# Patient Record
Sex: FEMALE | Race: WHITE | Employment: OTHER | URBAN - METROPOLITAN AREA
[De-identification: names, ages, dates, MRNs, and addresses within clinical notes are randomized per-mention and may not be internally consistent; named-entity substitution may affect disease eponyms.]

---

## 2017-07-20 ENCOUNTER — HOSPITAL ENCOUNTER (EMERGENCY)
Age: 62
Discharge: HOME | End: 2017-07-20
Attending: EMERGENCY MEDICINE | Admitting: EMERGENCY MEDICINE
Payer: MEDICARE

## 2017-07-20 ENCOUNTER — APPOINTMENT (OUTPATIENT)
Dept: GENERAL RADIOLOGY | Age: 62
End: 2017-07-20
Attending: EMERGENCY MEDICINE
Payer: MEDICARE

## 2017-07-20 VITALS
HEIGHT: 67 IN | BODY MASS INDEX: 25.58 KG/M2 | HEART RATE: 105 BPM | DIASTOLIC BLOOD PRESSURE: 70 MMHG | RESPIRATION RATE: 13 BRPM | TEMPERATURE: 98.7 F | SYSTOLIC BLOOD PRESSURE: 115 MMHG | WEIGHT: 163 LBS | OXYGEN SATURATION: 95 %

## 2017-07-20 DIAGNOSIS — N20.0 KIDNEY STONE: ICD-10-CM

## 2017-07-20 DIAGNOSIS — R55 VASOVAGAL NEAR SYNCOPE: Primary | ICD-10-CM

## 2017-07-20 DIAGNOSIS — E86.0 DEHYDRATION: ICD-10-CM

## 2017-07-20 DIAGNOSIS — K59.00 CONSTIPATION, UNSPECIFIED CONSTIPATION TYPE: ICD-10-CM

## 2017-07-20 LAB
ALBUMIN SERPL BCP-MCNC: 3.3 G/DL (ref 3.4–5)
ALBUMIN/GLOB SERPL: 1 {RATIO} (ref 0.8–1.7)
ALP SERPL-CCNC: 77 U/L (ref 45–117)
ALT SERPL-CCNC: 25 U/L (ref 13–56)
ANION GAP BLD CALC-SCNC: 10 MMOL/L (ref 3–18)
AST SERPL W P-5'-P-CCNC: 26 U/L (ref 15–37)
ATRIAL RATE: 91 BPM
BASOPHILS # BLD AUTO: 0 K/UL (ref 0–0.1)
BASOPHILS # BLD: 0 % (ref 0–2)
BILIRUB SERPL-MCNC: 0.5 MG/DL (ref 0.2–1)
BUN SERPL-MCNC: 11 MG/DL (ref 7–18)
BUN/CREAT SERPL: 34 (ref 12–20)
CALCIUM SERPL-MCNC: 9.2 MG/DL (ref 8.5–10.1)
CALCULATED P AXIS, ECG09: -18 DEGREES
CALCULATED R AXIS, ECG10: -29 DEGREES
CALCULATED T AXIS, ECG11: -77 DEGREES
CHLORIDE SERPL-SCNC: 106 MMOL/L (ref 100–108)
CO2 SERPL-SCNC: 25 MMOL/L (ref 21–32)
CREAT SERPL-MCNC: 0.32 MG/DL (ref 0.6–1.3)
DIAGNOSIS, 93000: NORMAL
DIFFERENTIAL METHOD BLD: ABNORMAL
EOSINOPHIL # BLD: 0.1 K/UL (ref 0–0.4)
EOSINOPHIL NFR BLD: 1 % (ref 0–5)
ERYTHROCYTE [DISTWIDTH] IN BLOOD BY AUTOMATED COUNT: 13.7 % (ref 11.6–14.5)
GLOBULIN SER CALC-MCNC: 3.3 G/DL (ref 2–4)
GLUCOSE SERPL-MCNC: 130 MG/DL (ref 74–99)
HCT VFR BLD AUTO: 39.8 % (ref 35–45)
HGB BLD-MCNC: 13 G/DL (ref 12–16)
LYMPHOCYTES # BLD AUTO: 37 % (ref 21–52)
LYMPHOCYTES # BLD: 3 K/UL (ref 0.9–3.6)
MCH RBC QN AUTO: 31.8 PG (ref 24–34)
MCHC RBC AUTO-ENTMCNC: 32.7 G/DL (ref 31–37)
MCV RBC AUTO: 97.3 FL (ref 74–97)
MONOCYTES # BLD: 0.3 K/UL (ref 0.05–1.2)
MONOCYTES NFR BLD AUTO: 4 % (ref 3–10)
NEUTS SEG # BLD: 4.7 K/UL (ref 1.8–8)
NEUTS SEG NFR BLD AUTO: 58 % (ref 40–73)
P-R INTERVAL, ECG05: 134 MS
PLATELET # BLD AUTO: 292 K/UL (ref 135–420)
PMV BLD AUTO: 9.2 FL (ref 9.2–11.8)
POTASSIUM SERPL-SCNC: 3.9 MMOL/L (ref 3.5–5.5)
PROT SERPL-MCNC: 6.6 G/DL (ref 6.4–8.2)
Q-T INTERVAL, ECG07: 372 MS
QRS DURATION, ECG06: 98 MS
QTC CALCULATION (BEZET), ECG08: 457 MS
RBC # BLD AUTO: 4.09 M/UL (ref 4.2–5.3)
SODIUM SERPL-SCNC: 141 MMOL/L (ref 136–145)
VENTRICULAR RATE, ECG03: 91 BPM
WBC # BLD AUTO: 8.1 K/UL (ref 4.6–13.2)

## 2017-07-20 PROCEDURE — 93005 ELECTROCARDIOGRAM TRACING: CPT

## 2017-07-20 PROCEDURE — 74011250636 HC RX REV CODE- 250/636: Performed by: EMERGENCY MEDICINE

## 2017-07-20 PROCEDURE — 96361 HYDRATE IV INFUSION ADD-ON: CPT

## 2017-07-20 PROCEDURE — 96360 HYDRATION IV INFUSION INIT: CPT

## 2017-07-20 PROCEDURE — 74022 RADEX COMPL AQT ABD SERIES: CPT

## 2017-07-20 PROCEDURE — 85025 COMPLETE CBC W/AUTO DIFF WBC: CPT | Performed by: EMERGENCY MEDICINE

## 2017-07-20 PROCEDURE — 99285 EMERGENCY DEPT VISIT HI MDM: CPT

## 2017-07-20 PROCEDURE — 80053 COMPREHEN METABOLIC PANEL: CPT | Performed by: EMERGENCY MEDICINE

## 2017-07-20 RX ADMIN — SODIUM CHLORIDE 1000 ML: 900 INJECTION, SOLUTION INTRAVENOUS at 12:09

## 2017-07-20 NOTE — ED TRIAGE NOTES
Pt arrived via EMS alert & oriented times 4. Pt c/o passing out on commode. Pt has a history of MS. Pt c/o neck pain.

## 2017-07-20 NOTE — DISCHARGE INSTRUCTIONS
Constipation: Care Instructions  Your Care Instructions  Constipation means that you have a hard time passing stools (bowel movements). People pass stools from 3 times a day to once every 3 days. What is normal for you may be different. Constipation may occur with pain in the rectum and cramping. The pain may get worse when you try to pass stools. Sometimes there are small amounts of bright red blood on toilet paper or the surface of stools. This is because of enlarged veins near the rectum (hemorrhoids). A few changes in your diet and lifestyle may help you avoid ongoing constipation. Your doctor may also prescribe medicine to help loosen your stool. Some medicines can cause constipation. These include pain medicines and antidepressants. Tell your doctor about all the medicines you take. Your doctor may want to make a medicine change to ease your symptoms. Follow-up care is a key part of your treatment and safety. Be sure to make and go to all appointments, and call your doctor if you are having problems. It's also a good idea to know your test results and keep a list of the medicines you take. How can you care for yourself at home? · Drink plenty of fluids, enough so that your urine is light yellow or clear like water. If you have kidney, heart, or liver disease and have to limit fluids, talk with your doctor before you increase the amount of fluids you drink. · Include high-fiber foods in your diet each day. These include fruits, vegetables, beans, and whole grains. · Get at least 30 minutes of exercise on most days of the week. Walking is a good choice. You also may want to do other activities, such as running, swimming, cycling, or playing tennis or team sports. · Take a fiber supplement, such as Citrucel or Metamucil, every day. Read and follow all instructions on the label. · Schedule time each day for a bowel movement. A daily routine may help.  Take your time having your bowel movement. · Support your feet with a small step stool when you sit on the toilet. This helps flex your hips and places your pelvis in a squatting position. · Your doctor may recommend an over-the-counter laxative to relieve your constipation. Examples are Milk of Magnesia and MiraLax. Read and follow all instructions on the label. Do not use laxatives on a long-term basis. When should you call for help? Call your doctor now or seek immediate medical care if:  · You have new or worse belly pain. · You have new or worse nausea or vomiting. · You have blood in your stools. Watch closely for changes in your health, and be sure to contact your doctor if:  · Your constipation is getting worse. · You do not get better as expected. Where can you learn more? Go to http://pedro luis-derrell.info/. Enter [id number] in the search box to learn more about \"Constipation: Care Instructions. \"  Current as of: March 20, 2017  Content Version: 11.3  © 6709-0950 [employer]. Care instructions adapted under license by [employer] (which disclaims liability or warranty for this information). If you have questions about a medical condition or this instruction, always ask your healthcare professional. [place] [address] any warranty or liability for your use of this information. Dehydration: Care Instructions  Your Care Instructions  Dehydration happens when your body loses too much fluid. This might happen when you do not drink enough water or you lose large amounts of fluids from your body because of diarrhea, vomiting, or sweating. Severe dehydration can be life-threatening. Water and minerals called electrolytes help put your body fluids back in balance. Learn the early signs of fluid loss, and drink more fluids to prevent dehydration. Follow-up care is a key part of your treatment and safety.  Be sure to make and go to all appointments, and call your doctor if you are having problems. It's also a good idea to know your test results and keep a list of the medicines you take. How can you care for yourself at home? · To prevent dehydration, drink plenty of fluids, enough so that your urine is light yellow or clear like water. Choose water and other caffeine-free clear liquids until you feel better. If you have kidney, heart, or liver disease and have to limit fluids, talk with your doctor before you increase the amount of fluids you drink. · If you do not feel like eating or drinking, try taking small sips of water, sports drinks, or other rehydration drinks. · Get plenty of rest.  To prevent dehydration  · Add more fluids to your diet and daily routine, unless your doctor has told you not to. · During hot weather, drink more fluids. Drink even more fluids if you exercise a lot. Stay away from drinks with alcohol or caffeine. · Watch for the symptoms of dehydration. These include:  ¨ A dry, sticky mouth. ¨ Dark yellow urine, and not much of it. ¨ Dry and sunken eyes. ¨ Feeling very tired. · Learn what problems can lead to dehydration. These include:  ¨ Diarrhea, fever, and vomiting. ¨ Any illness with a fever, such as pneumonia or the flu. ¨ Activities that cause heavy sweating, such as endurance races and heavy outdoor work in hot or humid weather. ¨ Alcohol or drug abuse or withdrawal.  ¨ Certain medicines, such as cold and allergy pills (antihistamines), diet pills (diuretics), and laxatives. ¨ Certain diseases, such as diabetes, cancer, and heart or kidney disease. When should you call for help? Call 911 anytime you think you may need emergency care. For example, call if:  · You passed out (lost consciousness). Call your doctor now or seek immediate medical care if:  · You are confused and cannot think clearly. · You are dizzy or lightheaded, or you feel like you may faint. · You have signs of needing more fluids.  You have sunken eyes and a dry mouth, and you pass only a little dark urine. · You cannot keep fluids down. Watch closely for changes in your health, and be sure to contact your doctor if:  · You are not making tears. · Your skin is very dry and sags slowly back into place after you pinch it. · Your mouth and eyes are very dry. Where can you learn more? Go to http://pedro luis-derrell.info/. Enter E613 in the search box to learn more about \"Dehydration: Care Instructions. \"  Current as of: March 20, 2017  Content Version: 11.3  © 5174-9347 [employer]. Care instructions adapted under license by [employer] (which disclaims liability or warranty for this information). If you have questions about a medical condition or this instruction, always ask your healthcare professional. [address] any warranty or liability for your use of this information. Vasovagal Syncope: Care Instructions  Your Care Instructions    Vasovagal syncope (say \"nlj-rce-PLZ-gul PQRH-nra-rqy\") is sudden dizziness or fainting that can be set off by things such as pain, stress, fear, or trauma. You may sweat or feel lightheaded, sick to your stomach, or tingly. The problem causes the heart rate to slow and the blood vessels to widen, or dilate, for a short time. When this happens, blood pools in the lower body, and less blood goes to the brain. You can usually get relief by lying down with your legs raised (elevated). This helps more blood to flow to your brain and may help relieve symptoms like feeling dizzy. Some doctors may recommend a technique that involves tensing your fists and arms. This type of fainting is often easy to predict. For example, it happens to some people when they see blood or have to get a shot. They may feel symptoms before they faint. An episode of vasovagal syncope usually responds well to self-care. Other treatment often isn't needed.  But if the fainting keeps happening, your doctor may suggest further treatments. Follow-up care is a key part of your treatment and safety. Be sure to make and go to all appointments, and call your doctor if you are having problems. It's also a good idea to know your test results and keep a list of the medicines you take. How can you care for yourself at home? · Drink plenty of fluids to prevent dehydration. If you have kidney, heart, or liver disease and have to limit fluids, talk with your doctor before you increase your fluid intake. · Try to avoid things that you think may set off vasovagal syncope. · Talk to your doctor about any medicines you take. Some medicines may increase the chance of this condition occurring. · If you feel symptoms, lie down with your legs raised. Talk to your doctor about what to do if your symptoms come back. When should you call for help? Call 911 anytime you think you may need emergency care. For example, call if:  · You have symptoms of a heart problem. These may include:  ¨ Chest pain or pressure. ¨ Severe trouble breathing. ¨ A fast or irregular heartbeat. Watch closely for changes in your health, and be sure to contact your doctor if:  · You have more episodes of fainting at home. · You do not get better as expected. Where can you learn more? Go to http://pedro luis-derrell.info/. Enter L754 in the search box to learn more about \"Vasovagal Syncope: Care Instructions. \"  Current as of: March 20, 2017  Content Version: 11.3  © 8330-4444 [employer]. Care instructions adapted under license by [employer] (which disclaims liability or warranty for this information). If you have questions about a medical condition or this instruction, always ask your healthcare professional. [place] [address] any warranty or liability for your use of this information.

## 2017-07-20 NOTE — ED PROVIDER NOTES
HPI Comments: Pt with history of spastic paraparesis/ALS, paroxysmal V tach with AICD in place, muscle atrophy, fibromyalgia and anxiety, presents to ED with complaint of lightheadedness while on the toilet this morning. She notes that she is chronically constipated and \"I've been trying to pass a kidney stone and had to have a bowel movement this morning\" when she became lightheaded. She denies any LOC. She notes L flank pain which she attributes to her kidney stone. She is followed by urology, Dr. Ny Villalpando. She denies any firing or abnormality of her AICD this morning. Pt denies any recent changes to her medications. No recent vomiting or diarrhea and pt states she has been eating and drinking normally. No other acute symptoms or complaints were noted.        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)     Hx: UTI (urinary tract infection)     ICD (implantable cardiac defibrillator) in place     Leg edema     Leg pain     Macrocytosis     Memory loss     Migraine     Muscle atrophy     Myalgia and myositis, unspecified     Osteoarthritis     Osteoarthrosis, unspecified whether generalized or localized, unspecified site     Paroxysmal ventricular tachycardia (HCC)     Piercing     Rosacea     Spastic paraparesis (HCC)     Ventricular tachycardia (paroxysmal) (HCC)     Vitamin D deficiency        Past Surgical History:   Procedure Laterality Date    HX HYSTERECTOMY      HX ORTHOPAEDIC  5/10    HX OTHER SURGICAL CHG INTERNAL DEFIBRILLATOR    HX OTHER SURGICAL  10/26/11    LAP CHOLECYSTECTOMY/GRAPH         History reviewed. No pertinent family history. Social History     Social History    Marital status: LEGALLY      Spouse name: N/A    Number of children: N/A    Years of education: N/A     Occupational History    Not on file. Social History Main Topics    Smoking status: Never Smoker    Smokeless tobacco: Never Used    Alcohol use Yes      Comment: occ    Drug use: Not on file    Sexual activity: Not on file     Other Topics Concern    Not on file     Social History Narrative         ALLERGIES: Amoxicillin; Ancef [cefazolin]; Atenolol; Avelox [moxifloxacin]; and Hydrochlorothiazide    Review of Systems   Constitutional: Negative for chills, diaphoresis and fever. HENT: Negative. Eyes: Negative for visual disturbance. Respiratory: Negative for chest tightness and shortness of breath. Cardiovascular: Negative for chest pain, palpitations and leg swelling. Gastrointestinal: Positive for constipation. Negative for abdominal pain, anal bleeding, blood in stool, diarrhea and vomiting. Endocrine: Negative. Genitourinary: Positive for flank pain. Negative for dysuria and hematuria. Musculoskeletal: Negative for back pain, neck pain and neck stiffness. Skin: Negative for pallor. Neurological: Positive for dizziness and light-headedness. Negative for syncope, weakness, numbness and headaches. Hematological: Does not bruise/bleed easily. Vitals:    07/20/17 1134   BP: 136/79   Pulse: 93   Resp: 17   SpO2: 95%            Physical Exam   Constitutional: She is oriented to person, place, and time. She appears well-developed and well-nourished. No distress. Resting comfortably on stretcher, eyes closed during history and whispers answers to questions   HENT:   Head: Normocephalic and atraumatic. MM slightly tacky   Eyes: Conjunctivae and EOM are normal. No scleral icterus. Sclera clear bilaterally   Neck: Normal range of motion. Neck supple. No JVD present. Non-tender to palpation   Cardiovascular: Normal rate, regular rhythm and normal heart sounds. Exam reveals no gallop and no friction rub. No murmur heard. Pulmonary/Chest: Effort normal and breath sounds normal. No respiratory distress. She has no wheezes. She has no rales. She exhibits no tenderness. No crepitance with palpation   Abdominal: Soft. Bowel sounds are normal. She exhibits no distension. There is no tenderness. There is no rebound and no guarding. Genitourinary:   Genitourinary Comments: No CVA tenderness   Musculoskeletal: She exhibits no edema or tenderness. Normal inspection of upper extremities. No edema noted to bilateral lower extremities   Lymphadenopathy:     She has no cervical adenopathy. Neurological: She is alert and oriented to person, place, and time. No cranial nerve deficit. She exhibits normal muscle tone. Normal motor and sensation bilaterally to upper and lower extremities   Skin: Skin is warm and dry. No rash noted. She is not diaphoretic. Psychiatric:   Normal mood and affect. Vitals reviewed. MDM  Number of Diagnoses or Management Options  Constipation, unspecified constipation type:   Dehydration:   Kidney stone:   Vasovagal near syncope:   Diagnosis management comments: Pt with history of lightheadedness/near syncope while sitting on the toilet \"trying to pass a kidney stone\" and stool. History of chronic constipation. Benign abdominal exam and reassuring VS.  Will check labs, XR and interrogate AICD. Reassess but anticipate discharge to home with PMD follow up for vagal episode. Reviewed results with pt. Will d/c to home and pt will follow up with her doctors. Encouraged PO fluids.        Amount and/or Complexity of Data Reviewed  Clinical lab tests: ordered and reviewed  Tests in the radiology section of CPT®: ordered and reviewed  Tests in the medicine section of CPT®: ordered and reviewed  Decide to obtain previous medical records or to obtain history from someone other than the patient: yes  Obtain history from someone other than the patient: yes  Independent visualization of images, tracings, or specimens: yes    Risk of Complications, Morbidity, and/or Mortality  Presenting problems: moderate  Management options: moderate    Patient Progress  Patient progress: stable    ED Course       Procedures    EKG:  NSR, rate 91, normal axis, poor R wave progression, non-specific ST-T abnormalities. Increased baseline artifact.

## 2018-02-14 ENCOUNTER — APPOINTMENT (OUTPATIENT)
Dept: GENERAL RADIOLOGY | Age: 63
DRG: 689 | End: 2018-02-14
Attending: EMERGENCY MEDICINE
Payer: MEDICARE

## 2018-02-14 ENCOUNTER — HOSPITAL ENCOUNTER (INPATIENT)
Age: 63
LOS: 3 days | Discharge: HOME | DRG: 689 | End: 2018-02-17
Attending: EMERGENCY MEDICINE | Admitting: INTERNAL MEDICINE
Payer: MEDICARE

## 2018-02-14 ENCOUNTER — APPOINTMENT (OUTPATIENT)
Dept: CT IMAGING | Age: 63
DRG: 689 | End: 2018-02-14
Attending: EMERGENCY MEDICINE
Payer: MEDICARE

## 2018-02-14 DIAGNOSIS — N30.01 ACUTE CYSTITIS WITH HEMATURIA: ICD-10-CM

## 2018-02-14 DIAGNOSIS — N20.0 KIDNEY STONE: Primary | ICD-10-CM

## 2018-02-14 PROBLEM — N39.0 UTI (URINARY TRACT INFECTION): Status: ACTIVE | Noted: 2018-02-14

## 2018-02-14 PROBLEM — G12.21 ALS (AMYOTROPHIC LATERAL SCLEROSIS) (HCC): Status: ACTIVE | Noted: 2018-02-14

## 2018-02-14 PROBLEM — N13.30 HYDRONEPHROSIS: Status: ACTIVE | Noted: 2018-02-14

## 2018-02-14 PROBLEM — E86.0 DEHYDRATION: Status: ACTIVE | Noted: 2018-02-14

## 2018-02-14 LAB
ALBUMIN SERPL-MCNC: 3.3 G/DL (ref 3.4–5)
ALBUMIN/GLOB SERPL: 0.9 {RATIO} (ref 0.8–1.7)
ALP SERPL-CCNC: 72 U/L (ref 45–117)
ALT SERPL-CCNC: 22 U/L (ref 13–56)
ANION GAP SERPL CALC-SCNC: 8 MMOL/L (ref 3–18)
APPEARANCE UR: ABNORMAL
AST SERPL-CCNC: 20 U/L (ref 15–37)
ATRIAL RATE: 95 BPM
BACTERIA URNS QL MICRO: ABNORMAL /HPF
BASOPHILS # BLD: 0 K/UL (ref 0–0.1)
BASOPHILS NFR BLD: 0 % (ref 0–2)
BILIRUB SERPL-MCNC: 0.4 MG/DL (ref 0.2–1)
BILIRUB UR QL: NEGATIVE
BUN SERPL-MCNC: 17 MG/DL (ref 7–18)
BUN/CREAT SERPL: 57 (ref 12–20)
CALCIUM SERPL-MCNC: 9.3 MG/DL (ref 8.5–10.1)
CALCULATED P AXIS, ECG09: 53 DEGREES
CALCULATED R AXIS, ECG10: -27 DEGREES
CALCULATED T AXIS, ECG11: -66 DEGREES
CHLORIDE SERPL-SCNC: 106 MMOL/L (ref 100–108)
CK MB CFR SERPL CALC: 3 % (ref 0–4)
CK MB SERPL-MCNC: 1.6 NG/ML (ref 5–25)
CK SERPL-CCNC: 53 U/L (ref 26–192)
CO2 SERPL-SCNC: 25 MMOL/L (ref 21–32)
COLOR UR: YELLOW
CREAT SERPL-MCNC: 0.3 MG/DL (ref 0.6–1.3)
DIAGNOSIS, 93000: NORMAL
DIFFERENTIAL METHOD BLD: ABNORMAL
EOSINOPHIL # BLD: 0 K/UL (ref 0–0.4)
EOSINOPHIL NFR BLD: 0 % (ref 0–5)
EPITH CASTS URNS QL MICRO: ABNORMAL /LPF (ref 0–5)
ERYTHROCYTE [DISTWIDTH] IN BLOOD BY AUTOMATED COUNT: 14.3 % (ref 11.6–14.5)
GLOBULIN SER CALC-MCNC: 3.7 G/DL (ref 2–4)
GLUCOSE SERPL-MCNC: 134 MG/DL (ref 74–99)
GLUCOSE UR STRIP.AUTO-MCNC: NEGATIVE MG/DL
HCT VFR BLD AUTO: 40.4 % (ref 35–45)
HGB BLD-MCNC: 13.1 G/DL (ref 12–16)
HGB UR QL STRIP: ABNORMAL
KETONES UR QL STRIP.AUTO: 40 MG/DL
LACTATE BLD-SCNC: 1.1 MMOL/L (ref 0.4–2)
LEUKOCYTE ESTERASE UR QL STRIP.AUTO: ABNORMAL
LYMPHOCYTES # BLD: 1.3 K/UL (ref 0.9–3.6)
LYMPHOCYTES NFR BLD: 10 % (ref 21–52)
MCH RBC QN AUTO: 30.1 PG (ref 24–34)
MCHC RBC AUTO-ENTMCNC: 32.4 G/DL (ref 31–37)
MCV RBC AUTO: 92.9 FL (ref 74–97)
MONOCYTES # BLD: 0.4 K/UL (ref 0.05–1.2)
MONOCYTES NFR BLD: 3 % (ref 3–10)
NEUTS SEG # BLD: 11.3 K/UL (ref 1.8–8)
NEUTS SEG NFR BLD: 87 % (ref 40–73)
NITRITE UR QL STRIP.AUTO: NEGATIVE
P-R INTERVAL, ECG05: 206 MS
PH UR STRIP: 6 [PH] (ref 5–8)
PLATELET # BLD AUTO: 366 K/UL (ref 135–420)
PMV BLD AUTO: 9.5 FL (ref 9.2–11.8)
POTASSIUM SERPL-SCNC: 3.9 MMOL/L (ref 3.5–5.5)
PROT SERPL-MCNC: 7 G/DL (ref 6.4–8.2)
PROT UR STRIP-MCNC: NEGATIVE MG/DL
Q-T INTERVAL, ECG07: 374 MS
QRS DURATION, ECG06: 102 MS
QTC CALCULATION (BEZET), ECG08: 469 MS
RBC # BLD AUTO: 4.35 M/UL (ref 4.2–5.3)
RBC #/AREA URNS HPF: ABNORMAL /HPF (ref 0–5)
SODIUM SERPL-SCNC: 139 MMOL/L (ref 136–145)
SP GR UR REFRACTOMETRY: 1.02 (ref 1–1.03)
TROPONIN I SERPL-MCNC: <0.02 NG/ML (ref 0–0.04)
UROBILINOGEN UR QL STRIP.AUTO: 0.2 EU/DL (ref 0.2–1)
VENTRICULAR RATE, ECG03: 95 BPM
WBC # BLD AUTO: 13.1 K/UL (ref 4.6–13.2)
WBC URNS QL MICRO: ABNORMAL /HPF (ref 0–4)

## 2018-02-14 PROCEDURE — 71045 X-RAY EXAM CHEST 1 VIEW: CPT

## 2018-02-14 PROCEDURE — 81001 URINALYSIS AUTO W/SCOPE: CPT | Performed by: EMERGENCY MEDICINE

## 2018-02-14 PROCEDURE — 80053 COMPREHEN METABOLIC PANEL: CPT | Performed by: EMERGENCY MEDICINE

## 2018-02-14 PROCEDURE — 96361 HYDRATE IV INFUSION ADD-ON: CPT

## 2018-02-14 PROCEDURE — 87186 SC STD MICRODIL/AGAR DIL: CPT | Performed by: EMERGENCY MEDICINE

## 2018-02-14 PROCEDURE — 74011636320 HC RX REV CODE- 636/320: Performed by: EMERGENCY MEDICINE

## 2018-02-14 PROCEDURE — 74011000250 HC RX REV CODE- 250: Performed by: EMERGENCY MEDICINE

## 2018-02-14 PROCEDURE — 85025 COMPLETE CBC W/AUTO DIFF WBC: CPT | Performed by: EMERGENCY MEDICINE

## 2018-02-14 PROCEDURE — 87077 CULTURE AEROBIC IDENTIFY: CPT | Performed by: EMERGENCY MEDICINE

## 2018-02-14 PROCEDURE — 94761 N-INVAS EAR/PLS OXIMETRY MLT: CPT

## 2018-02-14 PROCEDURE — 74011250637 HC RX REV CODE- 250/637: Performed by: INTERNAL MEDICINE

## 2018-02-14 PROCEDURE — 99285 EMERGENCY DEPT VISIT HI MDM: CPT

## 2018-02-14 PROCEDURE — 87086 URINE CULTURE/COLONY COUNT: CPT | Performed by: EMERGENCY MEDICINE

## 2018-02-14 PROCEDURE — 74177 CT ABD & PELVIS W/CONTRAST: CPT

## 2018-02-14 PROCEDURE — 96375 TX/PRO/DX INJ NEW DRUG ADDON: CPT

## 2018-02-14 PROCEDURE — 93005 ELECTROCARDIOGRAM TRACING: CPT

## 2018-02-14 PROCEDURE — 65660000000 HC RM CCU STEPDOWN

## 2018-02-14 PROCEDURE — 74011250636 HC RX REV CODE- 250/636: Performed by: EMERGENCY MEDICINE

## 2018-02-14 PROCEDURE — 96374 THER/PROPH/DIAG INJ IV PUSH: CPT

## 2018-02-14 PROCEDURE — 87040 BLOOD CULTURE FOR BACTERIA: CPT | Performed by: EMERGENCY MEDICINE

## 2018-02-14 PROCEDURE — 83605 ASSAY OF LACTIC ACID: CPT

## 2018-02-14 PROCEDURE — 74011250636 HC RX REV CODE- 250/636: Performed by: INTERNAL MEDICINE

## 2018-02-14 PROCEDURE — 82550 ASSAY OF CK (CPK): CPT | Performed by: EMERGENCY MEDICINE

## 2018-02-14 RX ORDER — OXYCODONE HYDROCHLORIDE 5 MG/1
10 TABLET ORAL 4 TIMES DAILY
Status: DISCONTINUED | OUTPATIENT
Start: 2018-02-14 | End: 2018-02-17 | Stop reason: HOSPADM

## 2018-02-14 RX ORDER — HYDROMORPHONE HYDROCHLORIDE 1 MG/ML
1 INJECTION, SOLUTION INTRAMUSCULAR; INTRAVENOUS; SUBCUTANEOUS
Status: DISPENSED | OUTPATIENT
Start: 2018-02-14 | End: 2018-02-15

## 2018-02-14 RX ORDER — FENTANYL CITRATE 50 UG/ML
50 INJECTION, SOLUTION INTRAMUSCULAR; INTRAVENOUS
Status: COMPLETED | OUTPATIENT
Start: 2018-02-14 | End: 2018-02-14

## 2018-02-14 RX ORDER — HYDROMORPHONE HYDROCHLORIDE 1 MG/ML
1 INJECTION, SOLUTION INTRAMUSCULAR; INTRAVENOUS; SUBCUTANEOUS
Status: DISCONTINUED | OUTPATIENT
Start: 2018-02-14 | End: 2018-02-15

## 2018-02-14 RX ORDER — ALPRAZOLAM 0.25 MG/1
0.25 TABLET ORAL 3 TIMES DAILY
Status: DISCONTINUED | OUTPATIENT
Start: 2018-02-14 | End: 2018-02-17 | Stop reason: HOSPADM

## 2018-02-14 RX ORDER — DEXTROSE, SODIUM CHLORIDE, SODIUM LACTATE, POTASSIUM CHLORIDE, AND CALCIUM CHLORIDE 5; .6; .31; .03; .02 G/100ML; G/100ML; G/100ML; G/100ML; G/100ML
125 INJECTION, SOLUTION INTRAVENOUS CONTINUOUS
Status: DISCONTINUED | OUTPATIENT
Start: 2018-02-14 | End: 2018-02-17 | Stop reason: HOSPADM

## 2018-02-14 RX ORDER — SENNOSIDES 8.6 MG/1
1 TABLET ORAL DAILY
Status: DISCONTINUED | OUTPATIENT
Start: 2018-02-15 | End: 2018-02-17 | Stop reason: HOSPADM

## 2018-02-14 RX ORDER — PANTOPRAZOLE SODIUM 40 MG/10ML
40 INJECTION, POWDER, LYOPHILIZED, FOR SOLUTION INTRAVENOUS DAILY
Status: DISCONTINUED | OUTPATIENT
Start: 2018-02-15 | End: 2018-02-16

## 2018-02-14 RX ORDER — SODIUM CHLORIDE 0.9 % (FLUSH) 0.9 %
5-10 SYRINGE (ML) INJECTION AS NEEDED
Status: DISCONTINUED | OUTPATIENT
Start: 2018-02-14 | End: 2018-02-17 | Stop reason: HOSPADM

## 2018-02-14 RX ORDER — ONDANSETRON 2 MG/ML
4 INJECTION INTRAMUSCULAR; INTRAVENOUS
Status: DISCONTINUED | OUTPATIENT
Start: 2018-02-14 | End: 2018-02-17 | Stop reason: HOSPADM

## 2018-02-14 RX ORDER — RILUZOLE 50 MG/1
50 TABLET, FILM COATED ORAL EVERY 12 HOURS
Status: DISCONTINUED | OUTPATIENT
Start: 2018-02-14 | End: 2018-02-17 | Stop reason: HOSPADM

## 2018-02-14 RX ORDER — HEPARIN SODIUM 5000 [USP'U]/ML
5000 INJECTION, SOLUTION INTRAVENOUS; SUBCUTANEOUS EVERY 8 HOURS
Status: DISCONTINUED | OUTPATIENT
Start: 2018-02-14 | End: 2018-02-15

## 2018-02-14 RX ADMIN — IOPAMIDOL 100 ML: 612 INJECTION, SOLUTION INTRAVENOUS at 13:59

## 2018-02-14 RX ADMIN — OXYCODONE HYDROCHLORIDE 10 MG: 5 TABLET ORAL at 22:22

## 2018-02-14 RX ADMIN — HYDROMORPHONE HYDROCHLORIDE 1 MG: 1 INJECTION, SOLUTION INTRAMUSCULAR; INTRAVENOUS; SUBCUTANEOUS at 20:43

## 2018-02-14 RX ADMIN — SODIUM CHLORIDE 2190 ML: 900 INJECTION, SOLUTION INTRAVENOUS at 12:59

## 2018-02-14 RX ADMIN — CEFTRIAXONE 1 G: 1 INJECTION, POWDER, FOR SOLUTION INTRAMUSCULAR; INTRAVENOUS at 16:10

## 2018-02-14 RX ADMIN — ONDANSETRON 4 MG: 2 INJECTION INTRAMUSCULAR; INTRAVENOUS at 21:24

## 2018-02-14 RX ADMIN — FENTANYL CITRATE 50 MCG: 50 INJECTION INTRAMUSCULAR; INTRAVENOUS at 13:00

## 2018-02-14 RX ADMIN — MEROPENEM 1 G: 1 INJECTION, POWDER, FOR SOLUTION INTRAVENOUS at 16:10

## 2018-02-14 RX ADMIN — RILUZOLE 50 MG: 50 TABLET, FILM COATED ORAL at 22:22

## 2018-02-14 RX ADMIN — ALPRAZOLAM 0.25 MG: 0.25 TABLET ORAL at 22:22

## 2018-02-14 NOTE — H&P
History & Physical    Patient: Mary Ordonez MRN: [id number]  CSN: [id number]    YOB: 1955  Age: 58 y.o. Sex: female      DOA: 2/14/2018    Chief Complaint:   Chief Complaint   Patient presents with    Urinary Pain    Flank Pain          HPI:     Mary Ordonez is a 58 y.o.  female who has known ALS  Bed bound requires aide with feeding and drinking   Reports increasing abdominal and flank pain has chronic interstial cystitis, fibromyalgia  On chronic pain meds  Has had worsening dysuria and frequency despite   Taking oxycodone/xanax combo  Followed by  ALS clinic in [facility]       Past Medical History:   Diagnosis Date    AICD (automatic cardioverter/defibrillator) present     Anxiety attack     Back pain     Benign hypertension     Biliary colic 73/31/56    Bunion of great toe     Cardiac arrhythmia     Chest pain     Colon polyp     Depression     Diastolic dysfunction     Disc displacement     Diverticula of colon     Elevated blood pressure (not hypertension)     Esophageal reflux     Fibroid     Fibromyalgia     Hemorrhoid     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)     Hx: UTI (urinary tract infection)     ICD (implantable cardiac defibrillator) in place     Leg edema     Leg pain     Macrocytosis     Memory loss     Migraine     Muscle atrophy     Myalgia and myositis, unspecified     Osteoarthritis     Osteoarthrosis, unspecified whether generalized or localized, unspecified site     Paroxysmal ventricular tachycardia (HCC)     Piercing     Rosacea     Spastic paraparesis     Ventricular tachycardia (paroxysmal) (HCC)     Vitamin D deficiency        Past Surgical History:   Procedure Laterality Date    HX HYSTERECTOMY      HX ORTHOPAEDIC  5/10    HX OTHER SURGICAL      CHG INTERNAL DEFIBRILLATOR    HX OTHER SURGICAL  10/26/11    LAP CHOLECYSTECTOMY/GRAPH       History reviewed. No pertinent family history.     Social History Social History    Marital status: LEGALLY      Spouse name: N/A    Number of children: N/A    Years of education: N/A     Social History Main Topics    Smoking status: Never Smoker    Smokeless tobacco: Never Used    Alcohol use Yes      Comment: occ    Drug use: None    Sexual activity: Not Asked     Other Topics Concern    None     Social History Narrative       Prior to Admission medications    Medication Sig Start Date End Date Taking? Authorizing Provider   tolterodine (DETROL) 2 mg tablet Take 1 Tab by mouth two (2) times a day. 12/26/17   Benny Major MD   tolterodine (DETROL) 2 mg tablet TAKE 1 TABLET TWICE DAILY 12/22/17   Benny Major MD   trimethoprim-sulfamethoxazole (BACTRIM DS, SEPTRA DS) 160-800 mg per tablet Take 1 Tab by mouth two (2) times a day. 10/23/17   Benny Major MD   trimethoprim-sulfamethoxazole (BACTRIM DS) 160-800 mg per tablet Take 1 Tab by mouth two (2) times a day. 10/12/17   Benny Major MD   amoxicillin (AMOXIL) 500 mg capsule Take 1 Cap by mouth two (2) times a day. 10/6/17   Benny Major MD   riluzole (RILUTEK) tablet  11/22/16   Historical Provider   oxyCODONE IR (ROXICODONE) 10 mg tab immediate release tablet TAKE 1 TAB BY MOUTH 4 TIMES A DAY AS NEEDED 11/8/16   Historical Provider   ALPRAZolam (XANAX) 0.25 mg tablet TAKE 1 TABLET BY MOUTH 3 TIMES A DAY AS NEEDED 11/8/16   Historical Provider   amLODIPine (NORVASC) 5 mg tablet TAKE 1 TABLET BY MOUTH ONCE DAILY. 10/23/16   Historical Provider   gabapentin (NEURONTIN) 100 mg capsule TAKE 1 CAP BY MOUTH 3 TIMES DAILY. 10/23/16   Historical Provider   ASPIRIN/SALICYLAMIDE/CAFFEINE (BC HEADACHE POWDER PO) Take  by mouth. Historical Provider   acetaminophen (TYLENOL) 325 mg tablet Take  by mouth every four (4) hours as needed for Pain.     Historical Provider       Allergies   Allergen Reactions    Macrobid [Nitrofurantoin Monohyd/M-Cryst] Other (comments)     Pt passes out  Amoxicillin Other (comments)     GI Distress    Ancef [Cefazolin] Unknown (comments)     ? Rash      Atenolol Other (comments)     Intolerance causes forgetfullness    Avelox [Moxifloxacin] Rash and Itching     mild    Hydrochlorothiazide Unknown (comments)         Review of Systems  GENERAL: Patient alert, awake and oriented times 3, able to communicate full sentences and not in distress. Dysphonia noted slurred speech as well pressured   HEENT: No change in vision, no earache, tinnitus, sore throat or sinus congestion. NECK: No pain or stiffness. PULMONARY: No shortness of breath, cough or wheeze. Good FEV!1 recent decline per patient   Cardiovascular: no pnd or orthopnea, no CP  GASTROINTESTINAL: +abdominal pain, nausea, vomiting or diarrhea, melena or bright red blood per rectum. GENITOURINARY: No urinary frequency, urgency, hesitancy or dysuria. MUSCULOSKELETAL: No joint or muscle pain, no back pain, no recent trauma. DERMATOLOGIC: No rash, no itching, no lesions. ENDOCRINE: No polyuria, polydipsia, no heat or cold intolerance. No recent change in weight. HEMATOLOGICAL: No anemia or easy bruising or bleeding. NEUROLOGIC: No headache, seizures, numbness,+ tingling + weakness. Physical Exam:     Physical Exam:  Visit Vitals    /90    Pulse 86    Temp 97.5 °F (36.4 °C)    Resp 16    SpO2 93%      O2 Device: Room air    Temp (24hrs), Av.5 °F (36.4 °C), Min:97.5 °F (36.4 °C), Max:97.5 °F (36.4 °C)             General:  Alert, cooperative, no distress, appears stated age. Head: Normocephalic, without obvious abnormality, atraumatic. Eyes:  Conjunctivae/corneas clear. PERRL, EOMs intact. Mucus membranes dry    Nose: Nares normal. No drainage or sinus tenderness. Neck: Supple, symmetrical, trachea midline, no adenopathy, thyroid: no enlargement, no carotid bruit and no JVD. Lungs:   Clear to auscultation bilaterally.    Heart:  Regular rate and rhythm, S1, S2 normal.     Abdomen: Soft, tender. Bowel sounds normal.  Diffuse abdominal tenderness   Extremities: Extremities normal, atraumatic, no cyanosis or edema. Pulses: 2+ and symmetric all extremities. Skin:  No rashes or lesions   Neurologic: AAOx3, contractures times upper and lower extremity noted  Poor muscle tone   Rigid neck   aicd left precordial area         Labs Reviewed: All lab results for the last 24 hours reviewed. EKG    Procedures/imaging: see electronic medical records for all procedures/Xrays and details which were not copied into this note but were reviewed prior to creation of Plan    CT Results  (Last 48 hours)               02/14/18 1407  CT ABD PELV W CONT Final result    Impression:  IMPRESSION:       1.  4 mm obstructing left UVJ stone with moderate hydronephrosis. 2.  Mild prominence of the ureteral walls which may be associated with passed   stone or ureteritis. Correlate with urinalysis. 3.  Small foci of gas in the bladder which can be associated with infection or   recent instrumentation. 4.  Questionable hepatic steatosis, though better evaluated without IV contrast.   5.  Mild diverticulosis. Narrative:  CT ABDOMEN AND PELVIS WITH ENHANCEMENT       INDICATION: Left flank pain, dizziness, diarrhea, cloudy urine. TECHNIQUE: Axial images obtained of the abdomen and pelvis following the   uneventful administration of  100 cc's Isovue-300 nonionic intravenous contrast.    Coronal and sagittal reformatted images of the abdomen and pelvis were   obtained. All CT scans at this facility are performed using dose optimization technique as   appropriate to a performed exam, to include automated exposure control,   adjustment of the mA and/or kV according to patient size (including appropriate   matching first site-specific examinations), or use of iterative reconstruction   technique. COMPARISON: None.        ABDOMEN FINDINGS:        Liver: Question of hepatic steatosis. Spleen: Unremarkable. Pancreas: Pancreas is atrophic without ductal dilation. Biliary: Cholecystectomy without greater than expected bile duct dilation. Bowel: There is inspissated stool in the rectum. Mild diverticulosis. Appendix   is not visualized. No right lower quadrant inflammation. Peritoneum/ Retroperitoneum: Unremarkable. Lymph Nodes: Unremarkable. Adrenal Glands: Unremarkable. Kidneys: Right pelviectasis versus extrarenal pelvis without caliectasis or   hydroureter. Moderate left hydronephrosis with delayed enhancement left kidney. There is hydroureter to the UVJ where there is a 4 mm stone. There is prominence   of the left greater than right ureteral walls, especially near the obstructing   ureteral stone. No   Nephrolithiasis or lesion. Vessels: Unremarkable for age. PELVIS FINDINGS:        Bladder/ Pelvic Organs: Bladder is distended without wall thickening or   perivesicular haziness. There is a small foci of gas in the nondependent   bladder. Status post hysterectomy. No suspicious adnexal lesions. Mild presacral   haziness. Lung Base: Incompletely visualized line with lead in the right ventricle. Regions of subsegmental atelectasis or scarring at the lung bases with mild   associated bronchiectasis. Bones: Mild anasarca. Muscles are atrophic. Osteopenia. Lumbar facet   hypertrophy. Severe degenerative disc disease L4-L5 and L5-S1. Mild scoliosis. Narrowing bilateral hip joint spaces.                    Assessment/Plan     Principal Problem:    Kidney stone (2/14/2018)    Active Problems:    AICD (automatic cardioverter/defibrillator) present (10/9/2012)      UTI (urinary tract infection) (2/14/2018)      ALS (amyotrophic lateral sclerosis) ([facility] [address] (2/14/2018)      Hydronephrosis (2/14/2018)      Dehydration (2/14/2018)     Plan:  Cont pain meds round the clock  Dilaudid for severe break through pain   Ceftriaxone check cultures  Of urine allergy to ancef noted        DVT/GI Prophylaxis: Hep SQ    Discussed with patient at bedside about hospital admission and my plan care, who understood and agree with my plan care.     Madelaine Raymond MD  2/14/2018 6:44 PM

## 2018-02-14 NOTE — IP AVS SNAPSHOT
[address] [address] Lelo Mata Patient: Nathan Mccormick MRN: [id number] Formerly [facility], [facility]:7/20/9304 About your hospitalization You were admitted on:  February 14, 2018 You last received care in the:  [facility] [address] You were discharged on:  February 17, 2018 Why you were hospitalized Your primary diagnosis was:  Kidney Yash Kirill Your diagnoses also included:  Uti (Urinary Tract Infection), Aicd (Automatic Cardioverter/Defibrillator) Present, Als (Amyotrophic Lateral Sclerosis) (Hcc), Hydronephrosis, Dehydration Follow-up Information Follow up With Details Comments Contact Info Soledad Head MD   x 3 No answer left voicemail to return call in reg to a follow up .  AJF/CDB [address] [id number] 
[phone] Dax Candelaria MD Go on 3/2/2018 follow up @8:30am [address] 
[phone] Your Scheduled Appointments Friday March 02, 2018  8:30 AM EST Any with Dax Candelaria MD  
[facility] ([address]) [address] [address] [address] [place] [address]  
[phone] Discharge Orders None A check colton indicates which time of day the medication should be taken. My Medications START taking these medications Instructions Each Dose to Equal  
 Morning Noon Evening Bedtime  
 nitrofurantoin (macrocrystal-monohydrate) 100 mg capsule Commonly known as:  MACROBID Your last dose was: Your next dose is: Take 1 Cap by mouth two (2) times a day for 7 days. 100 mg CHANGE how you take these medications Instructions Each Dose to Equal  
 Morning Noon Evening Bedtime  
 tolterodine 2 mg tablet Commonly known as:  [place] What changed:  Another medication with the same name was removed.  Continue taking this medication, and follow the directions you see here. Your last dose was: Your next dose is: TAKE 1 TABLET TWICE DAILY CONTINUE taking these medications Instructions Each Dose to Equal  
 Morning Noon Evening Bedtime ALPRAZolam 0.25 mg tablet Commonly known as:  [place] Your last dose was: Your next dose is: TAKE 1 TABLET BY MOUTH 3 TIMES A DAY AS NEEDED  
     
   
   
   
  
 gabapentin 100 mg capsule Commonly known as:  NEURONTIN Your last dose was: Your next dose is: TAKE 1 CAP BY MOUTH 3 TIMES DAILY. oxyCODONE IR 10 mg Tab immediate release tablet Commonly known as:  [place] Your last dose was: Your next dose is: TAKE 1 TAB BY MOUTH 4 TIMES A DAY AS NEEDED  
     
   
   
   
  
 riluzole tablet Commonly known as:  Torsten Vargas Your last dose was: Your next dose is:    
   
   
      
   
   
   
  
 TYLENOL 325 mg tablet Generic drug:  acetaminophen Your last dose was: Your next dose is: Take  by mouth every four (4) hours as needed for Pain. STOP taking these medications   
 amLODIPine 5 mg tablet Commonly known as:  NORVASC  
   
  
 amoxicillin 500 mg capsule Commonly known as:  AMOXIL  
   
  
 BC HEADACHE POWDER PO  
   
  
 trimethoprim-sulfamethoxazole 160-800 mg per tablet Commonly known as:  BACTRIM DS, SEPTRA DS Where to Get Your Medications These medications were sent to [address] [place] [address], [facility] [address] [place] [address] Hours:  24-hours Phone:  [phone]  
  nitrofurantoin (macrocrystal-monohydrate) 100 mg capsule Discharge Instructions Patient armband removed and shredded MyChart Activation Thank you for requesting access to [employer]. Please follow the instructions below to securely access and download your online medical record. [employer] allows you to send messages to your doctor, view your test results, renew your prescriptions, schedule appointments, and more. How Do I Sign Up? 1. In your internet browser, go to [email or web address] 
2. Click on the First Time User? Click Here link in the Sign In box. You will be redirect to the New Member Sign Up page. 3. Enter your At[employer]ocor Access Code exactly as it appears below. You will not need to use this code after youve completed the sign-up process. If you do not sign up before the expiration date, you must request a new code. [employer] Access Code: [email or web address] Expires: 2018  2:57 PM (This is the date your At[employer]or access code will ) 4. Enter the last four digits of your Social Security Number (xxxx) and Date of Birth (mm/dd/yyyy) as indicated and click Submit. You will be taken to the next sign-up page. 5. Create a Atterocor ID. This will be your Atter[employer]or login ID and cannot be changed, so think of one that is secure and easy to remember. 6. Create a At[employer]or password. You can change your password at any time. 7. Enter your Password Reset Question and Answer. This can be used at a later time if you forget your password. 8. Enter your e-mail address. You will receive e-mail notification when new information is available in [address] 9. Click Sign Up. You can now view and download portions of your medical record. 10. Click the Download Summary menu link to download a portable copy of your medical information. Additional Information If you have questions, please visit the Frequently Asked Questions section of the At[employer]ocor website at [email or web address] Remember, [employer] is NOT to be used for urgent needs. For medical emergencies, dial 911. DISCHARGE SUMMARY from Nurse PATIENT INSTRUCTIONS: 
 
 
F-face looks uneven A-arms unable to move or move unevenly S-speech slurred or non-existent T-time-call 911 as soon as signs and symptoms begin-DO NOT go Back to bed or wait to see if you get better-TIME IS BRAIN. Warning Signs of HEART ATTACK Call 911 if you have these symptoms: 
? Chest discomfort. Most heart attacks involve discomfort in the center of the chest that lasts more than a few minutes, or that goes away and comes back. It can feel like uncomfortable pressure, squeezing, fullness, or pain. ? Discomfort in other areas of the upper body. Symptoms can include pain or discomfort in one or both arms, the back, neck, jaw, or stomach. ? Shortness of breath with or without chest discomfort. ? Other signs may include breaking out in a cold sweat, nausea, or lightheadedness. Don't wait more than five minutes to call [address]! Fast action can save your life. Calling 911 is almost always the fastest way to get lifesaving treatment. Emergency Medical Services staff can begin treatment when they arrive  up to an hour sooner than if someone gets to the hospital by car. The discharge information has been reviewed with the patient. The patient verbalized understanding. Discharge medications reviewed with the patient and appropriate educational materials and side effects teaching were provided. ___________________________________________________________________________________________________________________________________ MyChart Announcement We are excited to announce that we are making your provider's discharge notes available to you in [employer].   You will see these notes when they are completed and signed by the physician that discharged you from your recent hospital stay. If you have any questions or concerns about any information you see in Netheos, please call the Health Information Department where you were seen or reach out to your Primary Care Provider for more information about your plan of care. Introducing [facility]! [facility] introduces Netheos patient portal. Now you can access parts of your medical record, email your doctor's office, and request medication refills online. 1. In your internet browser, go to [email or web address] 2. Click on the First Time User? Click Here link in the Sign In box. You will see the New Member Sign Up page. 3. Enter your Netheos Access Code exactly as it appears below. You will not need to use this code after youve completed the sign-up process. If you do not sign up before the expiration date, you must request a new code. · Netheos Access Code: [email or web address] Expires: 5/18/2018  2:57 PM 
 
4. Enter the last four digits of your Social Security Number (xxxx) and Date of Birth (mm/dd/yyyy) as indicated and click Submit. You will be taken to the next sign-up page. 5. Create a Netheos ID. This will be your Netheos login ID and cannot be changed, so think of one that is secure and easy to remember. 6. Create a Netheos password. You can change your password at any time. 7. Enter your Password Reset Question and Answer. This can be used at a later time if you forget your password. 8. Enter your e-mail address. You will receive e-mail notification when new information is available in [address] 9. Click Sign Up. You can now view and download portions of your medical record. 10. Click the Download Summary menu link to download a portable copy of your medical information. If you have questions, please visit the Frequently Asked Questions section of the Netheos website. Remember, Netheos is NOT to be used for urgent needs. For medical emergencies, dial 911. Now available from your iPhone and Android! Providers Seen During Your Hospitalization Provider Specialty Primary office phone Brittany Perry MD Emergency Medicine [phone] Randolph Alonso MD Internal Medicine [phone] Larry Zuñiga MD Internal Medicine [phone] Moustapha Hoff MD [facility] [phone] Your Primary Care Physician (PCP) Primary Care Physician Office Phone Office Fax Ramsey Lindobaum [phone] You are allergic to the following Allergen Reactions Macrobid (Nitrofurantoin Monohyd/M-Cryst) Other (comments) Pt passes out 2/17/18: Pt denies syncope/lightheadedness to this. Brooke Lubin. Amoxicillin Other (comments) GI Distress Ancef (Cefazolin) Unknown (comments) ? Rash Atenolol Other (comments) Intolerance causes forgetfullness Avelox (Moxifloxacin) Rash Itching  
 mild Hydrochlorothiazide Unknown (comments) Recent Documentation Height Weight Breastfeeding? BMI OB Status Smoking Status 1.676 m 65.1 kg No 23.16 kg/m2 Hysterectomy Never Smoker Emergency Contacts Name Discharge Info Relation Home Work Mobile Darcy Mckeon DISCHARGE CAREGIVER [3] Child [2] [phone] Ressie Merlin  Spouse [3] [phone] Patient Belongings The following personal items are in your possession at time of discharge: 
  Dental Appliances: None  Visual Aid: None      Home Medications: None   Jewelry: Necklace, Ring  Clothing: Bathrobe, [employer], Dress, Footwear, Shirt, Shorts, Undergarments    Other Valuables: Cortney Arevalo Discharge Instructions Attachments/References UTI (URINARY TRACT INFECTION): FEMALE (ENGLISH) NITROFURANTOIN (BY MOUTH) (ENGLISH) Patient Handouts Urinary Tract Infection in Women: Care Instructions Your Care Instructions A urinary tract infection, or UTI, is a general term for an infection anywhere between the kidneys and the urethra (where urine comes out). Most UTIs are bladder infections. They often cause pain or burning when you urinate. UTIs are caused by bacteria and can be cured with antibiotics. Be sure to complete your treatment so that the infection goes away. Follow-up care is a key part of your treatment and safety. Be sure to make and go to all appointments, and call your doctor if you are having problems. It's also a good idea to know your test results and keep a list of the medicines you take. How can you care for yourself at home? · Take your antibiotics as directed. Do not stop taking them just because you feel better. You need to take the full course of antibiotics. · Drink extra water and other fluids for the next day or two. This may help wash out the bacteria that are causing the infection. (If you have kidney, heart, or liver disease and have to limit fluids, talk with your doctor before you increase your fluid intake.) · Avoid drinks that are carbonated or have caffeine. They can irritate the bladder. · Urinate often. Try to empty your bladder each time. · To relieve pain, take a hot bath or lay a heating pad set on low over your lower belly or genital area. Never go to sleep with a heating pad in place. To prevent UTIs · Drink plenty of water each day. This helps you urinate often, which clears bacteria from your system. (If you have kidney, heart, or liver disease and have to limit fluids, talk with your doctor before you increase your fluid intake.) · Urinate when you need to. · Urinate right after you have sex. · Change sanitary pads often. · Avoid douches, bubble baths, feminine hygiene sprays, and other feminine hygiene products that have deodorants. · After going to the bathroom, wipe from front to back. When should you call for help? Call your doctor now or seek immediate medical care if: ? · Symptoms such as fever, chills, nausea, or vomiting get worse or appear for the first time. ? · You have new pain in your back just below your rib cage. This is called flank pain. ? · There is new blood or pus in your urine. ? · You have any problems with your antibiotic medicine. ? Watch closely for changes in your health, and be sure to contact your doctor if: 
? · You are not getting better after taking an antibiotic for 2 days. ? · Your symptoms go away but then come back. Where can you learn more? Go to http://pedro luis-derrell.info/. Enter R052 in the search box to learn more about \"Urinary Tract Infection in Women: Care Instructions. \" Current as of: May 12, 2017 Content Version: 11.4 © 7487-6634 [employer]. Care instructions adapted under license by [employer] (which disclaims liability or warranty for this information). If you have questions about a medical condition or this instruction, always ask your healthcare professional. [place] [address] any warranty or liability for your use of this information. Nitrofurantoin (By mouth) Nitrofurantoin (skw-iaiz-tegw-AN-toyn) Treats or prevents urinary tract infections. Brand Name(s): Furadantin There may be other brand names for this medicine. When This Medicine Should Not Be Used: You should not use this medicine if you have had an allergic reaction to nitrofurantoin or if you are in your last few weeks of pregnancy (week 38 or later). You should not use this medicine if you have severe kidney disease, a decreased amount of urine, or are unable to urinate. You should not use this medicine if you had liver problems after using it before. Do not give this medicine to infants younger than 1 month of age. How to Use This Medicine:  
Liquid, Tablet · Your doctor will tell you how much medicine to use. Do not use more than directed. · It is best to take this medicine with food or milk. · Measure the oral liquid medicine with a marked measuring spoon, oral syringe, or medicine cup. · Take all of the medicine in your prescription to clear up your infection, even if you feel better after the first few doses. If a dose is missed: · Take a dose as soon as you remember. If it is almost time for your next dose, wait until then and take a regular dose. Do not take extra medicine to make up for a missed dose. How to Store and Dispose of This Medicine: · Store the medicine in a closed container at room temperature, away from heat, moisture, and direct light. · Ask your pharmacist, doctor, or health caregiver about the best way to dispose of any outdated medicine or medicine no longer needed. · Keep all medicine out of the reach of children. Never share your medicine with anyone. Drugs and Foods to Avoid: Ask your doctor or pharmacist before using any other medicine, including over-the-counter medicines, vitamins, and herbal products. · Make sure your doctor knows if you are also using probenecid (Benemid®) or sulfinpyrazone (Anturane®). · It is best not to use antacids containing magnesium trisilicate (such as Genaton®) while you are using nitrofurantoin. Warnings While Using This Medicine: · Make sure your doctor knows if you are pregnant or breastfeeding, or if you have kidney disease, liver disease, lung disease, anemia, diabetes, a mineral imbalance in the blood, vitamin B deficiency, or a blood condition called [id number]. · This medicine may cause your urine to become brown-colored. This is normal and will not affect how the medicine works. · This medicine can cause diarrhea. Call your doctor if the diarrhea becomes severe, does not stop, or is bloody. Do not take any medicine to stop diarrhea until you have talked to your doctor. Diarrhea can occur 2 months or more after you stop taking this medicine. · Use this medicine only to treat the infection you now have. · Call your doctor if your symptoms do not improve or if they get worse. · Your doctor will do lab tests at regular visits to check on the effects of this medicine. Keep all appointments. Possible Side Effects While Using This Medicine:  
Call your doctor right away if you notice any of these side effects: · Allergic reaction: Itching or hives, swelling in your face or hands, swelling or tingling in your mouth or throat, chest tightness, trouble breathing · Blistering, peeling, or red skin rash. · Cough, fever, chills, weakness, shortness of breath, or chest pain. · Dark-colored urine or pale stools. · Nausea, vomiting, loss of appetite, or pain in your upper stomach. · Numbness, tingling, or burning pain in your hands, arms, legs, or feet. · Severe and watery diarrhea that may contain blood. · Yellowing of your skin or the whites of your eyes. If you notice these less serious side effects, talk with your doctor: · Dizziness, headache, or blurred vision. · Mild nausea, vomiting, stomach pain, or loss of appetite. · Temporary hair loss. · Vaginal itching or discharge. If you notice other side effects that you think are caused by this medicine, tell your doctor. Call your doctor for medical advice about side effects. You may report side effects to FDA at 4-523-FDA-0552 © 2017 [address] Information is for End User's use only and may not be sold, redistributed or otherwise used for commercial purposes. The above information is an  only. It is not intended as medical advice for individual conditions or treatments. Talk to your doctor, nurse or pharmacist before following any medical regimen to see if it is safe and effective for you. Please provide this summary of care documentation to your next provider.  
  
  
 
  
Signatures-by signing, you are acknowledging that this After Visit Summary has been reviewed with you and you have received a copy. Patient Signature:  ____________________________________________________________ Date:  ____________________________________________________________  
  
Victorina Sleeper Provider Signature:  ____________________________________________________________ Date:  ____________________________________________________________

## 2018-02-14 NOTE — ED NOTES
Patient cleaned and new depends placed on at this time.  Patient prepared to be taken to room by transport call bell within reach

## 2018-02-14 NOTE — ED NOTES
TRANSFER - OUT REPORT:    Verbal report given to Senait RN  (name) on Dorian Peace  being transferred to 414(unit) for routine progression of care       Report consisted of patients Situation, Background, Assessment and   Recommendations(SBAR). Information from the following report(s) ED Summary was reviewed with the receiving nurse. Lines:       Opportunity for questions and clarification was provided.       Patient transported with:   Registered Nurse

## 2018-02-14 NOTE — ED PROVIDER NOTES
EMERGENCY DEPARTMENT HISTORY AND PHYSICAL EXAM    10:49 AM      Date: 2/14/2018  Patient Name: Shan Ross    History of Presenting Illness     No chief complaint on file. History Provided By: Patient    Additional History (Context): Shan Ross is a 58 y.o. female who presents with left flank pain, lightheadedness, diarrhea and cloudy, malodorous urine that began today. She denies any abdominal pain. No other symptoms or concerns were expressed. PCP: Paulette Daly MD    Chief Complaint: flank pain  Duration:  Began today  Timing:  constant  Location: left flank  Quality: Aching  Severity: Moderate  Modifying Factors:   Associated Symptoms: cloudy, malodorous urine, diarrhea, lightheadedness       Current Outpatient Prescriptions   Medication Sig Dispense Refill    tolterodine (DETROL) 2 mg tablet Take 1 Tab by mouth two (2) times a day. 60 Tab 1    tolterodine (DETROL) 2 mg tablet TAKE 1 TABLET TWICE DAILY 180 Tab 3    trimethoprim-sulfamethoxazole (BACTRIM DS, SEPTRA DS) 160-800 mg per tablet Take 1 Tab by mouth two (2) times a day. 14 Tab 0    trimethoprim-sulfamethoxazole (BACTRIM DS) 160-800 mg per tablet Take 1 Tab by mouth two (2) times a day. 14 Tab 0    amoxicillin (AMOXIL) 500 mg capsule Take 1 Cap by mouth two (2) times a day. 14 Cap 0    riluzole (RILUTEK) tablet       oxyCODONE IR (ROXICODONE) 10 mg tab immediate release tablet TAKE 1 TAB BY MOUTH 4 TIMES A DAY AS NEEDED  0    ALPRAZolam (XANAX) 0.25 mg tablet TAKE 1 TABLET BY MOUTH 3 TIMES A DAY AS NEEDED  1    amLODIPine (NORVASC) 5 mg tablet TAKE 1 TABLET BY MOUTH ONCE DAILY. 5    gabapentin (NEURONTIN) 100 mg capsule TAKE 1 CAP BY MOUTH 3 TIMES DAILY. 5    ASPIRIN/SALICYLAMIDE/CAFFEINE (BC HEADACHE POWDER PO) Take  by mouth.  acetaminophen (TYLENOL) 325 mg tablet Take  by mouth every four (4) hours as needed for Pain.          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)     Hx: UTI (urinary tract infection)     ICD (implantable cardiac defibrillator) in place     Leg edema     Leg pain     Macrocytosis     Memory loss     Migraine     Muscle atrophy     Myalgia and myositis, unspecified     Osteoarthritis     Osteoarthrosis, unspecified whether generalized or localized, unspecified site     Paroxysmal ventricular tachycardia (HCC)     Piercing     Rosacea     Spastic paraparesis     Ventricular tachycardia (paroxysmal) (HCC)     Vitamin D deficiency        Past Surgical History:  Past Surgical History:   Procedure Laterality Date    HX HYSTERECTOMY      HX ORTHOPAEDIC  5/10    HX OTHER SURGICAL      CHG INTERNAL DEFIBRILLATOR    HX OTHER SURGICAL  10/26/11    LAP CHOLECYSTECTOMY/GRAPH       Family History:  No family history on file. Social History:  Social History   Substance Use Topics    Smoking status: Never Smoker    Smokeless tobacco: Never Used    Alcohol use Yes      Comment: occ       Allergies: Allergies   Allergen Reactions    Macrobid [Nitrofurantoin Monohyd/M-Cryst] Other (comments)     Pt passes out    Amoxicillin Other (comments)     GI Distress    Ancef [Cefazolin] Unknown (comments)     ? Rash      Atenolol Other (comments)     Intolerance causes forgetfullness    Avelox [Moxifloxacin] Rash and Itching     mild    Hydrochlorothiazide Unknown (comments)         Review of Systems     Review of Systems   Constitutional: Positive for fatigue.    Gastrointestinal: Positive for diarrhea. Negative for abdominal pain. Genitourinary: Positive for flank pain. (+) cloudy urine    Neurological: Positive for light-headedness. All other systems reviewed and are negative. Physical Exam   There were no vitals taken for this visit. Physical Exam   Constitutional: She is oriented to person, place, and time. She appears well-developed and well-nourished. HENT:   Head: Normocephalic and atraumatic. Eyes: Pupils are equal, round, and reactive to light. Neck: Normal range of motion. Cardiovascular: Normal rate and regular rhythm. Pulmonary/Chest: Effort normal and breath sounds normal.   Abdominal: Soft. She exhibits no distension. There is no tenderness. There is no rebound. Genitourinary:   Genitourinary Comments: + left flank pain     Neurological: She is alert and oriented to person, place, and time. Generalized weakness     Skin: Skin is warm and dry. Diagnostic Study Results      EKG Nsr at 95 with normal axis normal intervals there is no ST elevation or depression or hypertrophy T inversion in V5 and V6 consistent with LVH    Medical Decision Making     : 58-year-old female presents with generalized weakness left flank pain and concern of urinary infection. She is afebrile here. Sepsis we will check basic blood work and urinalysis. Determine need for admission    2:52 PM 4mm stone; eval UA  ? Admission; pt does not self-cath. UA +; pt states not allergic to ceftriaxone. D/w dr. Juan Francisco Golden; will admit. ; paging urology. Per protocol for UTI/ cefe-levo; allergies to both. D/w Dr Dipika Ferreira, will consult 4:41 PM         Diagnosis     No diagnosis found.     _______________________________    Attestations:  Scribe Attestation     Jude Sutherland acting as a scribe for and in the presence of Opal Alcocer MD      February 14, 2018 at 10:49 AM       Provider Attestation:      I personally performed the services described in the documentation, reviewed the documentation, as recorded by the scribe in my presence, and it accurately and completely records my words and actions.  February 14, 2018 at 10:49 AM - Мария Roland MD    _______________________________

## 2018-02-14 NOTE — PROGRESS NOTES
Urology Progress Note        Assessment/Plan:     Patient Active Problem List   Diagnosis Code    Spastic paraparesis [id number]    Bunion of great toe P33.180    Biliary colic I26.64    Chest pain R07.9    Anxiety attack F41.0    Elevated blood pressure (not hypertension) R03.0    Migraine G43.909    Leg edema R60.0    Vitamin D deficiency E55.9    Colon polyp A17.7    Diastolic dysfunction S33.5    Benign hypertension I10    Paroxysmal ventricular tachycardia (HCC) I47.2    Osteoarthrosis, unspecified whether generalized or localized, unspecified site M19.90    Disc displacement [id number]    Macrocytosis D75.89    Hepatitis C carrier (HCC) B18.2    Rosacea L71.9    Myalgia and myositis, unspecified [id number]    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    Memory loss R41.3    Fibromyalgia M79.7    Cardiac arrhythmia I49.9    Ventricular tachycardia (paroxysmal) (HCC) I47.2    ICD (implantable cardiac defibrillator) in place Z95.810    Hx: UTI (urinary tract infection) Z87.440    Fibroid D25.9    Osteoarthritis M19.90    Back pain M54.9    Leg pain M79.606    Depression F32.9    AICD (automatic cardioverter/defibrillator) present Z95.810    Hemorrhoid K64.9    Diverticula of colon K57.30    Esophageal reflux K21.9    Muscle atrophy M62.50    Piercing [id number]    Kidney stone N20.0    UTI (urinary tract infection) N39.0         Plan:  hydration,   broad spectrum antbiotics,   will hold off on stent  unless sx  continue or any sx/sn of sepsis  would  add flomax   as well  will follow    Merlin Mayhew, MD    [phone]      Subjective:     Daily Progress Note: 2/14/2018 5:38 PM    Levon Nice is doing adequate. She reports pain is moderately controlled. She has no new complaints. She is tolerating full liquids and unable to get out of bed. Patient is voiding without difficulty. Hx of  ALS. ..  bedridden  Admitted with left flank pain,   UTI. Darnella Hopper no fever or chills. LA  wnl,  slt increase in   WBC  no n/v/d/fever    Objective:     Visit Vitals    /90    Pulse 86    Temp 97.5 °F (36.4 °C)    Resp 16    SpO2 93%        Temp (24hrs), Av.5 °F (36.4 °C), Min:97.5 °F (36.4 °C), Max:97.5 °F (36.4 °C)      Intake and Output:          PHYSICAL EXAMINATION:   Visit Vitals    /90    Pulse 86    Temp 97.5 °F (36.4 °C)    Resp 16    SpO2 93%     Constitutional:  no acute distress. ...  cachectic  HEENT: Normocephalic, Atraumatic   CV:   RRR nl  Respiratory: No respiratory distress or difficulties breathing   Abdomen:  Soft and nontender. No masses. No hepatosplenomegaly.  Female:  CVA tenderness: minimal on left side                         Skin:  Normal color. No evidence of jaundice. Lab/Data Review: All lab results for the last 24 hours reviewed. Labs:     CT: IMPRESSION  IMPRESSION:     1.  4 mm obstructing left UVJ stone with moderate hydronephrosis. 2.  Mild prominence of the ureteral walls which may be associated with passed  stone or ureteritis. Correlate with urinalysis. 3.  Small foci of gas in the bladder which can be associated with infection or  recent instrumentation. 4.  Questionable hepatic steatosis, though better evaluated without IV contrast.  5.  Mild diverticulosis. Labs: Results:   Chemistry    Recent Labs      18   1052   GLU  134*   NA  139   K  3.9   CL  106   CO2  25   BUN  17   CREA  0.30*   CA  9.3   AGAP  8   BUCR  57*   AP  72   TP  7.0   ALB  3.3*   GLOB  3.7   AGRAT  0.9      CBC w/Diff Recent Labs      18   1052   WBC  13.1   RBC  4.35   HGB  13.1   HCT  40.4   PLT  366   GRANS  87*   LYMPH  10*   EOS  0      Cultures No results for input(s): CULT in the last 72 hours.   All Micro Results     Procedure Component Value Units Date/Time    CULTURE, URINE [[id number]] Collected:  18 1516    Order Status:  Completed Specimen:  Clean catch Updated:  18    CULTURE, BLOOD [[id number]] Collected:  02/14/18 1230    Order Status:  Completed Specimen:  Blood from Blood Updated:  02/14/18 1324    CULTURE, BLOOD [[id number]] Collected:  02/14/18 1247    Order Status:  Completed Specimen:  Blood from Blood Updated:  02/14/18 1324            Urinalysis Color   Date Value Ref Range Status   02/14/2018 YELLOW   Final     Appearance   Date Value Ref Range Status   02/14/2018 TURBID   Final     Specific gravity   Date Value Ref Range Status   02/14/2018 1.024 1.005 - 1.030   Final     pH (UA)   Date Value Ref Range Status   02/14/2018 6.0 5.0 - 8.0   Final     Protein   Date Value Ref Range Status   02/14/2018 NEGATIVE  NEG mg/dL Final     Ketone   Date Value Ref Range Status   02/14/2018 40 (A) NEG mg/dL Final     Bilirubin   Date Value Ref Range Status   02/14/2018 NEGATIVE  NEG   Final     Blood   Date Value Ref Range Status   02/14/2018 SMALL (A) NEG   Final     Urobilinogen   Date Value Ref Range Status   02/14/2018 0.2 0.2 - 1.0 EU/dL Final     Nitrites   Date Value Ref Range Status   02/14/2018 NEGATIVE  NEG   Final     Leukocyte Esterase   Date Value Ref Range Status   02/14/2018 LARGE (A) NEG   Final     Potassium   Date Value Ref Range Status   02/14/2018 3.9 3.5 - 5.5 mmol/L Final     Creatinine   Date Value Ref Range Status   02/14/2018 0.30 (L) 0.6 - 1.3 MG/DL Final     BUN   Date Value Ref Range Status   02/14/2018 17 7.0 - 18 MG/DL Final      PSA No results for input(s): PSA in the last 72 hours.    Coagulation No results found for: PTP, INR, APTT

## 2018-02-14 NOTE — ED NOTES
Patient given medication per [facility], patient requesting pain medication, MD notified no new orders received at this time.

## 2018-02-14 NOTE — Clinical Note
Status[de-identified] Inpatient [101] Type of Bed: Telemetry [19] Inpatient Hospitalization Certified Necessary for the Following Reasons: 3. Patient receiving treatment that can only be provided in an inpatient setting (further clarification in H&P documentation) Admitting Diagnosis: UTI (urinary tract infection) [879328] Admitting Diagnosis: Kidney stone [813082] Admitting Physician: Natalie Beavers [[id number]] Attending Physician: Natalie Beavers [[id number]] Estimated Length of Stay: 2 Midnights Discharge Plan[de-identified] Home with Office Follow-up

## 2018-02-14 NOTE — IP AVS SNAPSHOT
[address] Patient: Santhosh Roland MRN: [id number] MADINA:[id number] A check colton indicates which time of day the medication should be taken. My Medications START taking these medications Instructions Each Dose to Equal  
 Morning Noon Evening Bedtime  
 nitrofurantoin (macrocrystal-monohydrate) 100 mg capsule Commonly known as:  MACROBID Your last dose was: Your next dose is: Take 1 Cap by mouth two (2) times a day for 7 days. 100 mg CHANGE how you take these medications Instructions Each Dose to Equal  
 Morning Noon Evening Bedtime  
 tolterodine 2 mg tablet Commonly known as:  Johnna Meth What changed:  Another medication with the same name was removed. Continue taking this medication, and follow the directions you see here. Your last dose was: Your next dose is: TAKE 1 TABLET TWICE DAILY CONTINUE taking these medications Instructions Each Dose to Equal  
 Morning Noon Evening Bedtime ALPRAZolam 0.25 mg tablet Commonly known as:  Towana Lei Your last dose was: Your next dose is: TAKE 1 TABLET BY MOUTH 3 TIMES A DAY AS NEEDED  
     
   
   
   
  
 gabapentin 100 mg capsule Commonly known as:  NEURONTIN Your last dose was: Your next dose is: TAKE 1 CAP BY MOUTH 3 TIMES DAILY. oxyCODONE IR 10 mg Tab immediate release tablet Commonly known as:  Montes De Oca Cam Your last dose was: Your next dose is: TAKE 1 TAB BY MOUTH 4 TIMES A DAY AS NEEDED  
     
   
   
   
  
 riluzole tablet Commonly known as:  Torsten Vargas Your last dose was: Your next dose is:    
   
   
      
   
   
   
  
 TYLENOL 325 mg tablet Generic drug:  acetaminophen Your last dose was: Your next dose is: Take  by mouth every four (4) hours as needed for Pain. STOP taking these medications   
 amLODIPine 5 mg tablet Commonly known as:  NORVASC  
   
  
 amoxicillin 500 mg capsule Commonly known as:  AMOXIL  
   
  
 BC HEADACHE POWDER PO  
   
  
 trimethoprim-sulfamethoxazole 160-800 mg per tablet Commonly known as:  BACTRIM DS, SEPTRA DS Where to Get Your Medications These medications were sent to [address] [place] [address], [facility] [place], [place],[address] [place] [address] Hours:  24-hours Phone:  [phone]  
  nitrofurantoin (macrocrystal-monohydrate) 100 mg capsule

## 2018-02-15 LAB
ANION GAP SERPL CALC-SCNC: 5 MMOL/L (ref 3–18)
BASOPHILS # BLD: 0 K/UL (ref 0–0.1)
BASOPHILS NFR BLD: 0 % (ref 0–2)
BUN SERPL-MCNC: 9 MG/DL (ref 7–18)
BUN/CREAT SERPL: 36 (ref 12–20)
CALCIUM SERPL-MCNC: 8.6 MG/DL (ref 8.5–10.1)
CHLORIDE SERPL-SCNC: 108 MMOL/L (ref 100–108)
CO2 SERPL-SCNC: 27 MMOL/L (ref 21–32)
CREAT SERPL-MCNC: 0.25 MG/DL (ref 0.6–1.3)
DIFFERENTIAL METHOD BLD: ABNORMAL
EOSINOPHIL # BLD: 0 K/UL (ref 0–0.4)
EOSINOPHIL NFR BLD: 0 % (ref 0–5)
ERYTHROCYTE [DISTWIDTH] IN BLOOD BY AUTOMATED COUNT: 14.7 % (ref 11.6–14.5)
GLUCOSE SERPL-MCNC: 102 MG/DL (ref 74–99)
HCT VFR BLD AUTO: 35.6 % (ref 35–45)
HGB BLD-MCNC: 11.4 G/DL (ref 12–16)
LYMPHOCYTES # BLD: 3.4 K/UL (ref 0.9–3.6)
LYMPHOCYTES NFR BLD: 33 % (ref 21–52)
MCH RBC QN AUTO: 30.1 PG (ref 24–34)
MCHC RBC AUTO-ENTMCNC: 32 G/DL (ref 31–37)
MCV RBC AUTO: 93.9 FL (ref 74–97)
MONOCYTES # BLD: 1 K/UL (ref 0.05–1.2)
MONOCYTES NFR BLD: 9 % (ref 3–10)
NEUTS SEG # BLD: 5.8 K/UL (ref 1.8–8)
NEUTS SEG NFR BLD: 58 % (ref 40–73)
PLATELET # BLD AUTO: 341 K/UL (ref 135–420)
PMV BLD AUTO: 9.6 FL (ref 9.2–11.8)
POTASSIUM SERPL-SCNC: 3.6 MMOL/L (ref 3.5–5.5)
RBC # BLD AUTO: 3.79 M/UL (ref 4.2–5.3)
SODIUM SERPL-SCNC: 140 MMOL/L (ref 136–145)
WBC # BLD AUTO: 10.2 K/UL (ref 4.6–13.2)

## 2018-02-15 PROCEDURE — 74011250637 HC RX REV CODE- 250/637: Performed by: INTERNAL MEDICINE

## 2018-02-15 PROCEDURE — 85025 COMPLETE CBC W/AUTO DIFF WBC: CPT | Performed by: INTERNAL MEDICINE

## 2018-02-15 PROCEDURE — 80048 BASIC METABOLIC PNL TOTAL CA: CPT | Performed by: INTERNAL MEDICINE

## 2018-02-15 PROCEDURE — C9113 INJ PANTOPRAZOLE SODIUM, VIA: HCPCS | Performed by: INTERNAL MEDICINE

## 2018-02-15 PROCEDURE — 36415 COLL VENOUS BLD VENIPUNCTURE: CPT | Performed by: INTERNAL MEDICINE

## 2018-02-15 PROCEDURE — 74011250636 HC RX REV CODE- 250/636: Performed by: INTERNAL MEDICINE

## 2018-02-15 PROCEDURE — 65660000000 HC RM CCU STEPDOWN

## 2018-02-15 PROCEDURE — 74011250637 HC RX REV CODE- 250/637: Performed by: HOSPITALIST

## 2018-02-15 PROCEDURE — 92610 EVALUATE SWALLOWING FUNCTION: CPT

## 2018-02-15 PROCEDURE — 92526 ORAL FUNCTION THERAPY: CPT

## 2018-02-15 PROCEDURE — 74011258636 HC RX REV CODE- 258/636: Performed by: UROLOGY

## 2018-02-15 PROCEDURE — 74011000250 HC RX REV CODE- 250: Performed by: INTERNAL MEDICINE

## 2018-02-15 RX ORDER — TOLTERODINE TARTRATE 1 MG/1
1 TABLET, EXTENDED RELEASE ORAL 2 TIMES DAILY
Status: DISCONTINUED | OUTPATIENT
Start: 2018-02-15 | End: 2018-02-15

## 2018-02-15 RX ORDER — HYDROMORPHONE HYDROCHLORIDE 1 MG/ML
1 INJECTION, SOLUTION INTRAMUSCULAR; INTRAVENOUS; SUBCUTANEOUS
Status: DISCONTINUED | OUTPATIENT
Start: 2018-02-15 | End: 2018-02-17 | Stop reason: HOSPADM

## 2018-02-15 RX ORDER — TOLTERODINE TARTRATE 1 MG/1
1 TABLET, EXTENDED RELEASE ORAL EVERY 12 HOURS
Status: DISCONTINUED | OUTPATIENT
Start: 2018-02-15 | End: 2018-02-17 | Stop reason: HOSPADM

## 2018-02-15 RX ORDER — ACETAMINOPHEN 500 MG
500 TABLET ORAL
Status: DISCONTINUED | OUTPATIENT
Start: 2018-02-15 | End: 2018-02-17 | Stop reason: HOSPADM

## 2018-02-15 RX ADMIN — ALPRAZOLAM 0.25 MG: 0.25 TABLET ORAL at 09:35

## 2018-02-15 RX ADMIN — SODIUM CHLORIDE, SODIUM LACTATE, POTASSIUM CHLORIDE, CALCIUM CHLORIDE, AND DEXTROSE MONOHYDRATE 125 ML/HR: 600; 310; 30; 20; 5 INJECTION, SOLUTION INTRAVENOUS at 13:06

## 2018-02-15 RX ADMIN — ALPRAZOLAM 0.25 MG: 0.25 TABLET ORAL at 21:32

## 2018-02-15 RX ADMIN — OXYCODONE HYDROCHLORIDE 10 MG: 5 TABLET ORAL at 21:32

## 2018-02-15 RX ADMIN — OXYCODONE HYDROCHLORIDE 10 MG: 5 TABLET ORAL at 18:39

## 2018-02-15 RX ADMIN — HEPARIN SODIUM 5000 UNITS: 5000 INJECTION, SOLUTION INTRAVENOUS; SUBCUTANEOUS at 02:03

## 2018-02-15 RX ADMIN — TOLTERODINE TARTRATE 1 MG: 1 TABLET, FILM COATED ORAL at 13:03

## 2018-02-15 RX ADMIN — ALPRAZOLAM 0.25 MG: 0.25 TABLET ORAL at 16:48

## 2018-02-15 RX ADMIN — HYDROMORPHONE HYDROCHLORIDE 1 MG: 1 INJECTION, SOLUTION INTRAMUSCULAR; INTRAVENOUS; SUBCUTANEOUS at 16:49

## 2018-02-15 RX ADMIN — PANTOPRAZOLE SODIUM 40 MG: 40 INJECTION, POWDER, FOR SOLUTION INTRAVENOUS at 09:35

## 2018-02-15 RX ADMIN — HYDROMORPHONE HYDROCHLORIDE 1 MG: 1 INJECTION, SOLUTION INTRAMUSCULAR; INTRAVENOUS; SUBCUTANEOUS at 09:35

## 2018-02-15 RX ADMIN — RILUZOLE 50 MG: 50 TABLET, FILM COATED ORAL at 09:35

## 2018-02-15 RX ADMIN — OXYCODONE HYDROCHLORIDE 10 MG: 5 TABLET ORAL at 13:02

## 2018-02-15 RX ADMIN — HYDROMORPHONE HYDROCHLORIDE 1 MG: 1 INJECTION, SOLUTION INTRAMUSCULAR; INTRAVENOUS; SUBCUTANEOUS at 02:02

## 2018-02-15 RX ADMIN — RILUZOLE 50 MG: 50 TABLET, FILM COATED ORAL at 21:32

## 2018-02-15 RX ADMIN — OXYCODONE HYDROCHLORIDE 10 MG: 5 TABLET ORAL at 09:35

## 2018-02-15 RX ADMIN — CEFTRIAXONE 1 G: 1 INJECTION, POWDER, FOR SOLUTION INTRAMUSCULAR; INTRAVENOUS at 16:48

## 2018-02-15 RX ADMIN — TOLTERODINE TARTRATE 1 MG: 1 TABLET, FILM COATED ORAL at 23:30

## 2018-02-15 NOTE — ROUTINE PROCESS
Primary Nurse Mickie Ramirez RN and Tequila Salvador RN performed a dual skin assessment on this patient No impairment noted  Carlitos score is 19  Excoriation noted to haroon-area

## 2018-02-15 NOTE — PROGRESS NOTES
Problem: Falls - Risk of  Goal: *Absence of Falls  Document Garrett Fall Risk and appropriate interventions in the flowsheet.    Outcome: Progressing Towards Goal  Fall Risk Interventions:            Medication Interventions: Bed/chair exit alarm    Elimination Interventions: Call light in reach

## 2018-02-15 NOTE — PROGRESS NOTES
Care Management Interventions  PCP Verified by CM: Yes  Mode of Transport at Discharge: [address] 49 (family)  Transition of Care Consult (CM Consult): Discharge Planning  Current Support Network: Has Personal Caregivers, Relative's Home  Confirm Follow Up Transport: Family  Plan discussed with Pt/Family/Caregiver: Yes  Discharge Location  Discharge Placement: Home with family assistance (and personal care)    Pt is a 58year old admitted for UTI, kidney stone, hydronephrosis, dehydration. Pt is alert and oriented and son Kevyn John [phone] at bedside. Pt reports that she lives with her son Trisha Fay, daughter in law, and grandson. Pt reports that her daughter Hali Gant [phone] is another emergency contact. Pt reports that prior to admission she is wheelchair bound and total care assistance. Pt reports that she has a motorized wheelchair and that she has a bed that the head of the bed can rise up and down, and that she has a manual sharee lift and a shower chair, and BSC at home. Pt reports that she has personal care aids and that they are from [employer] and [employer]. Pt states that she has mostly 24/7 care but that she sometimes goes 2-3 hours at a time between caregivers but that she has a LifeAlert and cell phone that she can use to call for help if needed. Pt reports that she has used [facility] in the past but declines home health at this time stating that she has \"no skilled needs. \" Pt reports that her family will be transporting her home on discharge and that she has a wheelchair van and ramp to get into the house.

## 2018-02-15 NOTE — PROGRESS NOTES
Urology Progress Note        Assessment/Plan:     Patient Active Problem List   Diagnosis Code    Spastic paraparesis [id number]    Bunion of great toe A18.991    Biliary colic U14.02    Chest pain R07.9    Anxiety attack F41.0    Elevated blood pressure (not hypertension) R03.0    Migraine G43.909    Leg edema R60.0    Vitamin D deficiency E55.9    Colon polyp I42.7    Diastolic dysfunction B45.0    Benign hypertension I10    Paroxysmal ventricular tachycardia (HCC) I47.2    Osteoarthrosis, unspecified whether generalized or localized, unspecified site M19.90    Disc displacement [id number]    Macrocytosis D75.89    Hepatitis C carrier (HCC) B18.2    Rosacea L71.9    Myalgia and myositis, unspecified [id number]    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    Memory loss R41.3    Fibromyalgia M79.7    Cardiac arrhythmia I49.9    Ventricular tachycardia (paroxysmal) (HCC) I47.2    ICD (implantable cardiac defibrillator) in place Z95.810    Hx: UTI (urinary tract infection) Z87.440    Fibroid D25.9    Osteoarthritis M19.90    Back pain M54.9    Leg pain M79.606    Depression F32.9    AICD (automatic cardioverter/defibrillator) present Z95.810    Hemorrhoid K64.9    Diverticula of colon K57.30    Esophageal reflux K21.9    Muscle atrophy M62.50    Piercing [id number]    Kidney stone N20.0    UTI (urinary tract infection) N39.0    ALS (amyotrophic lateral sclerosis) (HCC) G12.21    Hydronephrosis N13.30    Dehydration E86.0         Plan:   continue antibiotics and hydration. .  no  sx for last  8 hrs. .. may have passed stone into bladder  will hold on any intervention at this time  ? D/C  tomorrow am if assymptomatic with  office F/U      Willy Bauer MD    [phone]      Subjective:     Daily Progress Note: 2/15/2018 7:59 AM    Key Barrera is doing good. She reports pain is well controlled. She has no complaints.  She is tolerating a solid diet and unable to get out of bed. Patient is voiding without difficulty. NO N/V/FEVER OR CHILLS  MUCH LESS  BACK  /  FLANK PAIN OVERNITE    Objective:     Visit Vitals    /70 (BP 1 Location: Left arm, BP Patient Position: At rest)    Pulse 63    Temp 98.2 °F (36.8 °C)    Resp 17    Ht 5' 6\" (1.676 m)    Wt 132 lb (59.9 kg)    SpO2 94%    Breastfeeding No    BMI 21.31 kg/m2        Temp (24hrs), Av.2 °F (36.8 °C), Min:97.5 °F (36.4 °C), Max:98.9 °F (37.2 °C)      Intake and Output:          PHYSICAL EXAMINATION:   Visit Vitals    /70 (BP 1 Location: Left arm, BP Patient Position: At rest)    Pulse 63    Temp 98.2 °F (36.8 °C)    Resp 17    Ht 5' 6\" (1.676 m)    Wt 132 lb (59.9 kg)    SpO2 94%    Breastfeeding No    BMI 21.31 kg/m2     Constitutional: Well developed, well-nourished female in no acute distress. HEENT: Normocephalic, Atraumatic   CV:   RRR neg  Respiratory: No respiratory distress or difficulties breathing   Abdomen:  Soft and nontender. No masses. No hepatosplenomegaly.  Female:  CVA tenderness: none                         Skin:  Normal color. No evidence of jaundice. Neuro/Psych:  Patient with appropriate affect. Alert and oriented. Lab/Data Review: All lab results for the last 24 hours reviewed. Labs:     Labs: Results:   Chemistry    Recent Labs      02/15/18   02418   1052   GLU  102*  134*   NA  140  139   K  3.6  3.9   CL  108  106   CO2  27  25   BUN  9  17   CREA  0.25*  0.30*   CA  8.6  9.3   AGAP  5  8   BUCR  36*  57*   AP   --   72   TP   --   7.0   ALB   --   3.3*   GLOB   --   3.7   AGRAT   --   0.9      CBC w/Diff Recent Labs      02/15/18   0245  18   1052   WBC  10.2  13.1   RBC  3.79*  4.35   HGB  11.4*  13.1   HCT  35.6  40.4   PLT  341  366   GRANS  58  87*   LYMPH  33  10*   EOS  0  0      Cultures No results for input(s): CULT in the last 72 hours.   All Micro Results     Procedure Component Value Units Date/Time    CULTURE, URINE [[id number]] Collected:  02/14/18 1513    Order Status:  Completed Specimen:  Clean catch Updated:  02/14/18 1936    CULTURE, URINE [[id number]]     Order Status:  Sent Specimen:  Urine from Clean catch     CULTURE, BLOOD [[id number]] Collected:  02/14/18 1230    Order Status:  Completed Specimen:  Blood from Blood Updated:  02/14/18 1324    CULTURE, BLOOD [[id number]] Collected:  02/14/18 1247    Order Status:  Completed Specimen:  Blood from Blood Updated:  02/14/18 1324            Urinalysis Color   Date Value Ref Range Status   02/14/2018 YELLOW   Final     Appearance   Date Value Ref Range Status   02/14/2018 TURBID   Final     Specific gravity   Date Value Ref Range Status   02/14/2018 1.024 1.005 - 1.030   Final     pH (UA)   Date Value Ref Range Status   02/14/2018 6.0 5.0 - 8.0   Final     Protein   Date Value Ref Range Status   02/14/2018 NEGATIVE  NEG mg/dL Final     Ketone   Date Value Ref Range Status   02/14/2018 40 (A) NEG mg/dL Final     Bilirubin   Date Value Ref Range Status   02/14/2018 NEGATIVE  NEG   Final     Blood   Date Value Ref Range Status   02/14/2018 SMALL (A) NEG   Final     Urobilinogen   Date Value Ref Range Status   02/14/2018 0.2 0.2 - 1.0 EU/dL Final     Nitrites   Date Value Ref Range Status   02/14/2018 NEGATIVE  NEG   Final     Leukocyte Esterase   Date Value Ref Range Status   02/14/2018 LARGE (A) NEG   Final     Potassium   Date Value Ref Range Status   02/15/2018 3.6 3.5 - 5.5 mmol/L Final     Creatinine   Date Value Ref Range Status   02/15/2018 0.25 (L) 0.6 - 1.3 MG/DL Final     BUN   Date Value Ref Range Status   02/15/2018 9 7.0 - 18 MG/DL Final      PSA No results for input(s): PSA in the last 72 hours.    Coagulation No results found for: PTP, INR, APTT

## 2018-02-15 NOTE — ROUTINE PROCESS
Bedside and Verbal shift change report given to [employer] (oncoming nurse) by Gianna Orlando (offgoing nurse). Report included the following information SBAR and Kardex       Shiftsummary. Pt medicated with Dilaudid twice for flank pain. IVF continues. Pt incontinent of urine. Kimberly Tyson

## 2018-02-15 NOTE — CDMP QUERY
Please clarify if this patient is being treated/managed for:   Functional quadriplegia in setting of ALS   with bedbound status     other         H&P  \"   .  female who has known ALS  Bed bound requires aide with feeding and drinking . \"       REFERENCE: Functional quadriplegia is the inability to use ones limbs and ambulate due to extreme debility or frailty by another medical condition (e.g. dementia, arthritis, contractures, etc.) without physical injury or damage to the spinal cord. Typically the patient requires total care.   The usual findings are: bedridden, inability to turn, unable to feed or groom self, urinary/fecal incontinence  and contractures.     Thank you,   Derick Jackson RN   CCDS    x [phone]

## 2018-02-15 NOTE — ROUTINE PROCESS
TRANSFER - IN REPORT:    Verbal report received from Senait(eduardo) on Key Barrera  being received from ER(unit) for routine progression of care      Report consisted of patients Situation, Background, Assessment and   Recommendations(SBAR). Information from the following report(s) SBAR and Kardex was reviewed with the receiving nurse. Opportunity for questions and clarification was provided. Assessment completed upon patients arrival to unit and care assumed.

## 2018-02-15 NOTE — PROGRESS NOTES
Problem: Dysphagia (Adult)  Goal: *Acute Goals and Plan of Care (Insert Text)  Patient will:  1. Tolerate PO trials with 0 s/s overt distress in 4/5 trials  2. Utilize compensatory swallow strategies/maneuvers (decrease bite/sip, size/rate, alt. liq/sol) with min cues in 4/5 trials    Rec:     Reg solid with thin liquids  Assistance with PO  Aspiration precautions  HOB >45 during po intake, remain >30 for 30-45 minutes after po   Small bites/sips; alternate liquid/solid with slow feeding rate   Oral care TID  Meds per pt preference            Speech LAnguage Pathology bedside swallow evaluation/treatement    Patient: Michelle Marie (15 y.o. female)  Date: 2/15/2018  Primary Diagnosis: UTI (urinary tract infection)  Kidney stone  UTI (urinary tract infection)  Hydronephrosis  Dehydration        Precautions: aspiration       ASSESSMENT :  Based on the objective data described below, the patient presents with mild oropharyngeal dysphagia. Strength, ROM, and coordination of orofacial musculature were weak/decreased during oral mech exam. All structures were intact and symmetrical. Pt with increased mastication and delayed a-p transit of reg solids secondary to deficits stated above; however, safe for reg solids with positive oral clearance noted. Audible swallow with weak laryngeal elevation appreciated to palpation. Pt tolerated all PO with no overt s/sx of aspiration. She reported completing MBS at [place] [address] within the last 3 months with no change of diet recs. Rec initiation of reg solid diet with thin liquids, aspiration precautions, oral care TID, and meds as tolerated. Pt will require assistance with PO. She reports that she has been attending an [address] [facility] clinic at [facility] where she is being followed by SLP intermittently to ensure safety of PO with progression of ALS. ST will continue to follow x 2-3 more visits to ensure safety of PO.      Patient will benefit from skilled intervention to address the above impairments. Patients rehabilitation potential is considered to be Good  Factors which may influence rehabilitation potential include:   [x]            Medical condition     PLAN :  Recommendations and Planned Interventions: See above  Frequency/Duration: Patient will be followed by speech-language pathology x 2-3 more visits to address goals. Discharge Recommendations: Home Health, Outpatient and To Be Determined     SUBJECTIVE:   Patient stated I'm doing alright eating and drinking. They've been making sure to check on me.     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)     Hx: UTI (urinary tract infection)     ICD (implantable cardiac defibrillator) in place     Leg edema     Leg pain     Macrocytosis     Memory loss     Migraine     Muscle atrophy     Myalgia and myositis, unspecified     Osteoarthritis     Osteoarthrosis, unspecified whether generalized or localized, unspecified site     Paroxysmal ventricular tachycardia (HCC)     Piercing     Rosacea     Spastic paraparesis     Ventricular tachycardia (paroxysmal) (HCC)     Vitamin D deficiency      Past Surgical History:   Procedure Laterality Date    HX HYSTERECTOMY      HX ORTHOPAEDIC  5/10    HX OTHER SURGICAL      CHG INTERNAL DEFIBRILLATOR    HX OTHER SURGICAL  10/26/11    LAP CHOLECYSTECTOMY/GRAPH     Prior Level of Function/Home Situation: private residence  Home Situation  Home Environment: Private residence  # Steps to Enter: 2  One/Two Story Residence: One story  Living Alone: Yes  Support Systems: Child(jemma), Home care staff  Patient Expects to be Discharged to[de-identified] Private residence  Current DME Used/Available at Home: Adaptive bathing aides, Adaptive dressing aides, Adaptive feeding aides  Diet prior to admission: reg solid with thin  Current Diet:  Reg solid with thin   Cognitive and Communication Status:  Neurologic State: Alert  Orientation Level: Oriented X4  Cognition: Follows commands   Oral Assessment:  Oral Assessment  Labial: Decreased rate  Dentition: Natural;Intact  Oral Hygiene: Adequate  Lingual: Decreased rate;Decreased strength  Velum: No impairment  Mandible: No impairment  P.O. Trials:  Patient Position: [facility] at [facility]  Vocal quality prior to P.O.: No impairment  Consistency Presented: Thin liquid; Solid;Puree  How Presented: Self-fed/presented;Cup/sip;Spoon;Straw  Bolus Acceptance: No impairment  Bolus Formation/Control: Impaired  Type of Impairment: Mastication;Delayed  Propulsion: Delayed (# of seconds)  Oral Residue: None  Initiation of Swallow: Delayed (# of seconds)  Laryngeal Elevation: Weak  Aspiration Signs/Symptoms: None  Pharyngeal Phase Characteristics: Audible swallow  Effective Modifications: Small sips and bites; Alternate liquids/solids  Cues for Modifications: Minimal-moderate     Oral Phase Severity: Mild  Pharyngeal Phase Severity : Mild     TREATMENT PERFORMED FOLLOWING EVALUATION:  Training performed following examination with regards to oropharyngeal anatomy/physiology, diet recs, aspiration precautions, and compensatory swallowing strategies. Pt able to return demo understanding. GCODESwallowing:  Swallow Current Status CI= 1-19%   Swallow Goal Status CH= 0%    The severity rating is based on the following outcomes:    Clinical Judgment    Pain:  Pt c/o 0/10 pain prior to evaluation. Pt c/o 0/10 pain post evaluation.     After treatment:   []            Patient left in no apparent distress sitting up in chair  [x]            Patient left in no apparent distress in bed  [x]            Call bell left within reach  [x]            Nursing notified  []            Caregiver present  []            Bed alarm activated    COMMUNICATION/EDUCATION:   [x]            SLP educated pt with regard to compensatory swallow strategies and       aspiration/reflux precautions including: small bites/sips,       alternate liquids/solids, decrease feeding rate, HOB > 45 with all po, and        upright in bed at 30 degrees after po for at least 45 minutes. [x]            Patient/family have participated as able in goal setting and plan of care.     Thank you for this referral.    Pieter Gan M.S. CCC-SLP/L  Speech-Language Pathologist

## 2018-02-15 NOTE — PROGRESS NOTES
met with patient, completed the initial Spiritual Assessment of the patient, and offered Pastoral Care, see flow sheets for interventions. Patient said she cannot handle paper and did not take the [address]. Pastoral support provided with limited conversation from patient. Patient does not have any Adventist/cultural needs that will affect patients preferences in health care. Chart reviewed. Chaplains will continue to follow and will provide pastoral care on an as needed/as requested basis. Jae Emerson MDiv.   Board Certified [employer] [phone]

## 2018-02-15 NOTE — PROGRESS NOTES
Hospitalist Progress Note    Patient: Bautista Munoz MRN: [id number]  CSN: [id number]    YOB: 1955  Age: 58 y.o. Sex: female    DOA: 2/14/2018 LOS:  LOS: 1 day            Her last BM was Monday, had small stool yesterday. Tends to stay constipated, but had diarrhea in the past after cholecystectomy. Left flank pain is better, last had pain ~ 2 am. Bladder spasms continue to be painful, was taking pyridium at home, has in her purse. Instructed her not to take any home meds. Resides at home with son and daughter in law. Has aide 7 hours daily, M-F. Has motorized wc. She declined heparin subcut. Assessment/Plan     1. ALS- Followed by  ALS clinic in [facility]   2. Functional quadriplegia - at baseline is Bed bound requires assistance with feeding and drinking   3. Chronic pain - chronic use of opioids   4. Kidney stone with hydronephrosis - per , will hold on any intervention at this time. Possible D/C tomorrow am if assymptomatic with office F/U with . 8. pcn / macrobid / ancef allergies  9. Bladder spasm - detrol  10. Thin body habitus - consult nutritionist  11. uti >100,000 GNR - on ceftriaxone, follow cx. 12. Anemia macrocytic  13. dvt prophylaxis  14. Full code. Pt and ot. Primary neurologist Adal Parekh DO. Additional Notes:      Case discussed with:  [x]Patient  []Family  [x]Nursing  [x]Case Management  DVT Prophylaxis:  []Lovenox  []Hep SQ  [x]SCDs  []Coumadin   []On Heparin gtt    Vital signs/Intake and Output:  Visit Vitals    /75 (BP 1 Location: Left arm, BP Patient Position: At rest)    Pulse 84    Temp 97.9 °F (36.6 °C)    Resp 18    Ht 5' 6\" (1.676 m)    Wt 59.9 kg (132 lb)    SpO2 96%    Breastfeeding No    BMI 21.31 kg/m2     Current Shift:     Last three shifts:  02/13 1901 - 02/15 0700  In: 1240 [P.O.:240; I.V.:1000]  Out: 0     Awake and alert. Friend at bedside  [place]. Perrl. RRR  cta b.l. Symmetrical chest expansion.  No dullness to percussion  Soft nt nd nabs  No flank ttp  No edema. dp 1+ b.l  Globally weak. No rash, no lesion. Medications Reviewed      Labs: Results:       Chemistry Recent Labs      02/15/18   0245  02/14/18   1052   GLU  102*  134*   NA  140  139   K  3.6  3.9   CL  108  106   CO2  27  25   BUN  9  17   CREA  0.25*  0.30*   CA  8.6  9.3   AGAP  5  8   BUCR  36*  57*   AP   --   72   TP   --   7.0   ALB   --   3.3*   GLOB   --   3.7   AGRAT   --   0.9      CBC w/Diff Recent Labs      02/15/18   0245  02/14/18   1052   WBC  10.2  13.1   RBC  3.79*  4.35   HGB  11.4*  13.1   HCT  35.6  40.4   PLT  341  366   GRANS  58  87*   LYMPH  33  10*   EOS  0  0      Cardiac Enzymes Recent Labs      02/14/18   1052   CPK  53   CKND1  3.0      Coagulation No results for input(s): PTP, INR, APTT in the last 72 hours. No lab exists for component: INREXT    Lipid Panel No results found for: CHOL, CHOLPOCT, CHOLX, CHLST, CHOLV, 106380, HDL, LDL, LDLC, DLDLP, 096206, VLDLC, VLDL, TGLX, TRIGL, TRIGP, TGLPOCT, CHHD, CHHDX   BNP No results for input(s): BNPP in the last 72 hours.    Liver Enzymes Recent Labs      02/14/18   1052   TP  7.0   ALB  3.3*   AP  72   SGOT  20      Thyroid Studies No results found for: T4, T3U, TSH, TSHEXT     Procedures/imaging: see electronic medical records for all procedures/Xrays and details which were not copied into this note but were reviewed prior to creation of Plan

## 2018-02-16 LAB
ANION GAP SERPL CALC-SCNC: 4 MMOL/L (ref 3–18)
BASOPHILS # BLD: 0 K/UL (ref 0–0.1)
BASOPHILS NFR BLD: 0 % (ref 0–2)
BUN SERPL-MCNC: 7 MG/DL (ref 7–18)
BUN/CREAT SERPL: 44 (ref 12–20)
CALCIUM SERPL-MCNC: 8.3 MG/DL (ref 8.5–10.1)
CHLORIDE SERPL-SCNC: 108 MMOL/L (ref 100–108)
CO2 SERPL-SCNC: 30 MMOL/L (ref 21–32)
CREAT SERPL-MCNC: 0.16 MG/DL (ref 0.6–1.3)
DIFFERENTIAL METHOD BLD: ABNORMAL
EOSINOPHIL # BLD: 0.1 K/UL (ref 0–0.4)
EOSINOPHIL NFR BLD: 1 % (ref 0–5)
ERYTHROCYTE [DISTWIDTH] IN BLOOD BY AUTOMATED COUNT: 14.6 % (ref 11.6–14.5)
GLUCOSE SERPL-MCNC: 117 MG/DL (ref 74–99)
HCT VFR BLD AUTO: 32.6 % (ref 35–45)
HGB BLD-MCNC: 10.3 G/DL (ref 12–16)
LYMPHOCYTES # BLD: 3.6 K/UL (ref 0.9–3.6)
LYMPHOCYTES NFR BLD: 44 % (ref 21–52)
MAGNESIUM SERPL-MCNC: 1.6 MG/DL (ref 1.6–2.6)
MCH RBC QN AUTO: 29.6 PG (ref 24–34)
MCHC RBC AUTO-ENTMCNC: 31.6 G/DL (ref 31–37)
MCV RBC AUTO: 93.7 FL (ref 74–97)
MONOCYTES # BLD: 0.8 K/UL (ref 0.05–1.2)
MONOCYTES NFR BLD: 9 % (ref 3–10)
NEUTS SEG # BLD: 3.8 K/UL (ref 1.8–8)
NEUTS SEG NFR BLD: 46 % (ref 40–73)
PLATELET # BLD AUTO: 298 K/UL (ref 135–420)
PMV BLD AUTO: 9.5 FL (ref 9.2–11.8)
POTASSIUM SERPL-SCNC: 3.6 MMOL/L (ref 3.5–5.5)
RBC # BLD AUTO: 3.48 M/UL (ref 4.2–5.3)
SODIUM SERPL-SCNC: 142 MMOL/L (ref 136–145)
WBC # BLD AUTO: 8.3 K/UL (ref 4.6–13.2)

## 2018-02-16 PROCEDURE — 36415 COLL VENOUS BLD VENIPUNCTURE: CPT | Performed by: HOSPITALIST

## 2018-02-16 PROCEDURE — 74011250636 HC RX REV CODE- 250/636: Performed by: FAMILY MEDICINE

## 2018-02-16 PROCEDURE — 74011250637 HC RX REV CODE- 250/637: Performed by: INTERNAL MEDICINE

## 2018-02-16 PROCEDURE — 74011250636 HC RX REV CODE- 250/636: Performed by: HOSPITALIST

## 2018-02-16 PROCEDURE — 65660000000 HC RM CCU STEPDOWN

## 2018-02-16 PROCEDURE — 83735 ASSAY OF MAGNESIUM: CPT | Performed by: HOSPITALIST

## 2018-02-16 PROCEDURE — 74011250637 HC RX REV CODE- 250/637: Performed by: FAMILY MEDICINE

## 2018-02-16 PROCEDURE — 74011250636 HC RX REV CODE- 250/636: Performed by: INTERNAL MEDICINE

## 2018-02-16 PROCEDURE — C9113 INJ PANTOPRAZOLE SODIUM, VIA: HCPCS | Performed by: INTERNAL MEDICINE

## 2018-02-16 PROCEDURE — 74011258636 HC RX REV CODE- 258/636: Performed by: UROLOGY

## 2018-02-16 PROCEDURE — 74011000250 HC RX REV CODE- 250: Performed by: INTERNAL MEDICINE

## 2018-02-16 PROCEDURE — 74011250637 HC RX REV CODE- 250/637: Performed by: HOSPITALIST

## 2018-02-16 PROCEDURE — 85025 COMPLETE CBC W/AUTO DIFF WBC: CPT | Performed by: HOSPITALIST

## 2018-02-16 PROCEDURE — 80048 BASIC METABOLIC PNL TOTAL CA: CPT | Performed by: HOSPITALIST

## 2018-02-16 PROCEDURE — 92526 ORAL FUNCTION THERAPY: CPT

## 2018-02-16 RX ORDER — MAGNESIUM SULFATE HEPTAHYDRATE 40 MG/ML
2 INJECTION, SOLUTION INTRAVENOUS ONCE
Status: COMPLETED | OUTPATIENT
Start: 2018-02-16 | End: 2018-02-16

## 2018-02-16 RX ORDER — OXYMETAZOLINE HCL 0.05 %
2 SPRAY, NON-AEROSOL (ML) NASAL
Status: DISCONTINUED | OUTPATIENT
Start: 2018-02-16 | End: 2018-02-17 | Stop reason: HOSPADM

## 2018-02-16 RX ADMIN — RILUZOLE 50 MG: 50 TABLET, FILM COATED ORAL at 21:06

## 2018-02-16 RX ADMIN — CEFTRIAXONE 1 G: 1 INJECTION, POWDER, FOR SOLUTION INTRAMUSCULAR; INTRAVENOUS at 18:04

## 2018-02-16 RX ADMIN — HYDROMORPHONE HYDROCHLORIDE 1 MG: 1 INJECTION, SOLUTION INTRAMUSCULAR; INTRAVENOUS; SUBCUTANEOUS at 12:37

## 2018-02-16 RX ADMIN — SODIUM CHLORIDE, SODIUM LACTATE, POTASSIUM CHLORIDE, CALCIUM CHLORIDE, AND DEXTROSE MONOHYDRATE 125 ML/HR: 600; 310; 30; 20; 5 INJECTION, SOLUTION INTRAVENOUS at 04:57

## 2018-02-16 RX ADMIN — MAGNESIUM SULFATE HEPTAHYDRATE 2 G: 40 INJECTION, SOLUTION INTRAVENOUS at 13:41

## 2018-02-16 RX ADMIN — OXYCODONE HYDROCHLORIDE 10 MG: 5 TABLET ORAL at 21:06

## 2018-02-16 RX ADMIN — HYDROMORPHONE HYDROCHLORIDE 1 MG: 1 INJECTION, SOLUTION INTRAMUSCULAR; INTRAVENOUS; SUBCUTANEOUS at 18:05

## 2018-02-16 RX ADMIN — OXYMETAZOLINE HYDROCHLORIDE 2 SPRAY: 5 SPRAY NASAL at 13:44

## 2018-02-16 RX ADMIN — ALPRAZOLAM 0.25 MG: 0.25 TABLET ORAL at 21:06

## 2018-02-16 RX ADMIN — OXYCODONE HYDROCHLORIDE 10 MG: 5 TABLET ORAL at 12:39

## 2018-02-16 RX ADMIN — Medication 10 ML: at 07:04

## 2018-02-16 RX ADMIN — ALPRAZOLAM 0.25 MG: 0.25 TABLET ORAL at 18:11

## 2018-02-16 RX ADMIN — PANTOPRAZOLE SODIUM 40 MG: 40 INJECTION, POWDER, FOR SOLUTION INTRAVENOUS at 09:28

## 2018-02-16 RX ADMIN — HYDROMORPHONE HYDROCHLORIDE 1 MG: 1 INJECTION, SOLUTION INTRAMUSCULAR; INTRAVENOUS; SUBCUTANEOUS at 03:24

## 2018-02-16 RX ADMIN — ONDANSETRON 4 MG: 2 INJECTION INTRAMUSCULAR; INTRAVENOUS at 10:13

## 2018-02-16 RX ADMIN — OXYMETAZOLINE HYDROCHLORIDE 2 SPRAY: 5 SPRAY NASAL at 22:54

## 2018-02-16 RX ADMIN — RILUZOLE 50 MG: 50 TABLET, FILM COATED ORAL at 09:27

## 2018-02-16 RX ADMIN — OXYCODONE HYDROCHLORIDE 10 MG: 5 TABLET ORAL at 09:27

## 2018-02-16 RX ADMIN — ALPRAZOLAM 0.25 MG: 0.25 TABLET ORAL at 09:27

## 2018-02-16 RX ADMIN — TOLTERODINE TARTRATE 1 MG: 1 TABLET, FILM COATED ORAL at 21:05

## 2018-02-16 RX ADMIN — HYDROMORPHONE HYDROCHLORIDE 1 MG: 1 INJECTION, SOLUTION INTRAMUSCULAR; INTRAVENOUS; SUBCUTANEOUS at 22:51

## 2018-02-16 RX ADMIN — TOLTERODINE TARTRATE 1 MG: 1 TABLET, FILM COATED ORAL at 09:27

## 2018-02-16 RX ADMIN — OXYCODONE HYDROCHLORIDE 10 MG: 5 TABLET ORAL at 18:11

## 2018-02-16 RX ADMIN — SODIUM CHLORIDE, SODIUM LACTATE, POTASSIUM CHLORIDE, CALCIUM CHLORIDE, AND DEXTROSE MONOHYDRATE 125 ML/HR: 600; 310; 30; 20; 5 INJECTION, SOLUTION INTRAVENOUS at 13:40

## 2018-02-16 NOTE — ROUTINE PROCESS
Bedside, Verbal and Written shift change report given to Tej Zhu (oncoming nurse) by Ivett MORA(offgoing nurse). Report included the following information SBAR, Kardex, and MAR. Hourly rounding and  chart checks completed.

## 2018-02-16 NOTE — PROGRESS NOTES
[facility] Hospitalist Group  Progress Note    Patient: Key Barrera Age: 58 y.o. : 1955 MR#: [id number] SSN: [id number]  Date: 2018     Subjective:     Denies fevers, but reports shaking chills. No N/V, CP, SOB, abd pain. Assessment/Plan:   1. UTI with L UVJ stone - continue abx, follow urine cx, growing >100k GNR, 10k possible non-hemo Strep. Speciation/sensitivities pending. Blood cx NGTD. Urology eval appreciated. 2. ALS with fxnl quadriplegia - no acute issues presently. Bedbound status, continue assistance with PO intake. 3. Bladder spasm - Detrol. No new issues. 4. HypoMg2 + - replete. 5. Aim for d/c tomorrow if sensitivities back. Additional Notes:      Case discussed with:  [x]Patient  []Family  []Nursing  []Case Management  DVT Prophylaxis:  []Lovenox  []Hep SQ  [x]SCDs  []Coumadin   []On Heparin gtt    Objective:   VS:   Visit Vitals    /70 (BP 1 Location: Right arm, BP Patient Position: Supine)    Pulse 77    Temp 98.1 °F (36.7 °C)    Resp 16    Ht 5' 6\" (1.676 m)    Wt 59.9 kg (132 lb)    SpO2 95%    Breastfeeding No    BMI 21.31 kg/m2      Tmax/24hrs: Temp (24hrs), Av °F (36.7 °C), Min:97.6 °F (36.4 °C), Max:98.4 °F (36.9 °C)    Intake/Output Summary (Last 24 hours) at 18 1014  Last data filed at 02/15/18 2245   Gross per 24 hour   Intake             1160 ml   Output                0 ml   Net             1160 ml       General:  Awake, alert, NAD. Appears tired. Cardiovascular:  RRR. Pulmonary:  CTA B.  GI:  Soft, NT/ND, NABS. Extremities:  No CT or edema.    Additional:      Labs:    Recent Results (from the past 24 hour(s))   METABOLIC PANEL, BASIC    Collection Time: 18  3:36 AM   Result Value Ref Range    Sodium 142 136 - 145 mmol/L    Potassium 3.6 3.5 - 5.5 mmol/L    Chloride 108 100 - 108 mmol/L    CO2 30 21 - 32 mmol/L    Anion gap 4 3.0 - 18 mmol/L    Glucose 117 (H) 74 - 99 mg/dL    BUN 7 7.0 - 18 MG/DL Creatinine 0.16 (L) 0.6 - 1.3 MG/DL    BUN/Creatinine ratio 44 (H) 12 - 20      GFR est AA >60 >60 ml/min/1.73m2    GFR est non-AA >60 >60 ml/min/1.73m2    Calcium 8.3 (L) 8.5 - 10.1 MG/DL   MAGNESIUM    Collection Time: 02/16/18  3:36 AM   Result Value Ref Range    Magnesium 1.6 1.6 - 2.6 mg/dL   CBC WITH AUTOMATED DIFF    Collection Time: 02/16/18  3:36 AM   Result Value Ref Range    WBC 8.3 4.6 - 13.2 K/uL    RBC 3.48 (L) 4.20 - 5.30 M/uL    HGB 10.3 (L) 12.0 - 16.0 g/dL    HCT 32.6 (L) 35.0 - 45.0 %    MCV 93.7 74.0 - 97.0 FL    MCH 29.6 24.0 - 34.0 PG    MCHC 31.6 31.0 - 37.0 g/dL    RDW 14.6 (H) 11.6 - 14.5 %    PLATELET 808 049 - 296 K/uL    MPV 9.5 9.2 - 11.8 FL    NEUTROPHILS 46 40 - 73 %    LYMPHOCYTES 44 21 - 52 %    MONOCYTES 9 3 - 10 %    EOSINOPHILS 1 0 - 5 %    BASOPHILS 0 0 - 2 %    ABS. NEUTROPHILS 3.8 1.8 - 8.0 K/UL    ABS. LYMPHOCYTES 3.6 0.9 - 3.6 K/UL    ABS. MONOCYTES 0.8 0.05 - 1.2 K/UL    ABS. EOSINOPHILS 0.1 0.0 - 0.4 K/UL    ABS.  BASOPHILS 0.0 0.0 - 0.1 K/UL    DF AUTOMATED         Signed By: iMke Tam MD     February 16, 2018 10:14 AM

## 2018-02-16 NOTE — PROGRESS NOTES
NUTRITION    Nutrition Consult: General Nutrition Management & Supplements      RECOMMENDATIONS / PLAN:     - Add Ensure Enlive once daily  - Continue RD inpatient monitoring and evaluation. NUTRITION INTERVENTIONS & DIAGNOSIS:     [x] Meals/snacks: modified composition  [x] Medical food supplement therapy: initiate    Nutrition Diagnosis:  Inadequate oral intake related to decreased appetite as evidenced by variable meal intake PTA    Patient meets criteria for Severe Protein Calorie Malnutrition as evidenced by:   ASPEN Malnutrition Criteria  Acute Illness, Chronic Illness, or Social/Enviornmental: Chronic illness  Energy Intake: Less than/equal to 75% of est energy req for greater than/equal to 1 month  Weight Loss: Greater than 10% x 6 mos  ASPEN Malnutrition Score - Chronic Illness: 12  Chronic Illness - Malnutrition Diagnosis: Severe malnutrition. ASSESSMENT:     Pt reported poor appetite and meal intake with unplanned wt loss PTA. Appetite and meal intake fair currently. Food preference discussed. Agreeable to nutrition supplement. Pt with difficulty having BM sometimes. Offered to add daily prune juice; pt declined. Noted on bowel regimen.     Average po intake adequate to meet patients estimated nutritional needs:   [] Yes     [] No   [x] Unable to determine at this time    Diet: DIET CARDIAC Regular; High Fiber      Food Allergies:  None known   Current Appetite:   [] Good     [x] Fair     [] Poor     [] Other:  Appetite/meal intake prior to admission:   [] Good     [x] Fair     [x] Poor     [x] Other: pt reported variable appetite and meal intake x more than month PTA   Feeding Limitations:  [] Swallowing difficulty    [] Chewing difficulty    [x] Other: SLP following; recommend regular consistency and thin liquids  Current Meal Intake: Patient Vitals for the past 100 hrs:   % Diet Eaten   02/15/18 2245 100 %   02/15/18 1331 90 %       BM:  2/15  Skin Integrity: excoriation to bilateral groin area  Edema:  None   Pertinent Medications: Reviewed: D5 LR at 125 mL/hr (150 grams dextrose, 510 kcal), zofran    Recent Labs      02/16/18   0336  02/15/18   0245  02/14/18   1052   NA  142  140  139   K  3.6  3.6  3.9   CL  108  108  106   CO2  30  27  25   GLU  117*  102*  134*   BUN  7  9  17   CREA  0.16*  0.25*  0.30*   CA  8.3*  8.6  9.3   MG  1.6   --    --    ALB   --    --   3.3*   SGOT   --    --   20   ALT   --    --   22       Intake/Output Summary (Last 24 hours) at 02/16/18 1437  Last data filed at 02/15/18 2245   Gross per 24 hour   Intake              920 ml   Output                0 ml   Net              920 ml       Anthropometrics:  Ht Readings from Last 1 Encounters:   02/14/18 5' 6\" (1.676 m)     Last 3 Recorded Weights in this Encounter    02/14/18 1330 02/15/18 0557   Weight: 66 kg (145 lb 6.4 oz) 59.9 kg (132 lb)     Body mass index is 21.31 kg/(m^2). Weight History:   Pt reported unplanned wt loss PTA, with recent weight gain of 8 month.  Per chart hx, weight loss of 31 lb (19%) x 4-5 months PTA    Weight Metrics 2/15/2018 10/6/2017 8/18/2017 7/21/2017 7/20/2017 5/22/2017 4/21/2017   Weight 132 lb 163 lb 163 lb 163 lb 163 lb 165 lb 165 lb   BMI 21.31 kg/m2 25.53 kg/m2 25.53 kg/m2 25.53 kg/m2 25.53 kg/m2 25.84 kg/m2 25.84 kg/m2        Admitting Diagnosis: UTI (urinary tract infection)  Kidney stone  UTI (urinary tract infection)  Hydronephrosis  Dehydration  Pertinent PMHx:  HTN, colon polyp, depression, diverticula of colon, esophageal reflux    Education Needs:        [x] None identified  [] Identified - Not appropriate at this time  []  Identified and addressed - refer to education log  Learning Limitations:   [] None identified  [x] Identified: memory loss  Cultural, Mosque & ethnic food preferences:  [x] None identified    [] Identified and addressed     ESTIMATED NUTRITION NEEDS:     Calories: 4418-6989 kcal (MSJx1.2-1.3) based on  [x] Actual BW: 60 kg      [] IBW Protein: 48-60 gm (0.8-1 gm/kg) based on  [x] Actual BW      [] IBW   Fluid: 1 mL/kcal     MONITORING & EVALUATION:     Nutrition Goal(s):   1. Po intake of meals will meet >75% of patient estimated nutritional needs within the next 7 days.   Outcome:  [] Met/Ongoing    []  Not Met    [x] New/Initial Goal     Monitoring:   [x] Food and beverage intake   [x] Diet order   [x] Nutrition-focused physical findings   [x] Treatment/therapy   [] Weight   [] Enteral nutrition intake        Previous Recommendations (for follow-up assessments only):     []   Implemented       []   Not Implemented (RD to address)      [] No Longer Appropriate     [] No Recommendation Made     Discharge Planning:   Cardiac diet; consistency per SLP  [x] Participated in care planning, discharge planning, & interdisciplinary rounds as appropriate      Susan Manzanares, [address] [place] [address]   Pager: [phone]

## 2018-02-16 NOTE — ADT AUTH CERT NOTES
Patient Demographics        Patient Name 72 Aaliyah Hicks Sex  Address Phone       Dee Harry [id number] Female 1955 [address] [place] [phone] (Home)  [phone] (Work)  [phone] (Mobile)           CSN:       [id number]           Admit Date: Admit Time Room Bed       2018 10:16  [[id number]] 01 [[id number]]           Attending Providers        Provider Pager From To       Yvrose Valenzuela MD  18       Sara Clemente MD  02/14/18 02/15/18       Claudeen Six, MD  02/15/18 02/16/18       Gae Bernheim, MD  18            Emergency Contact(s)        Name Relation Home Work [address] [place] [address] [phone]         Sylvie Rise Spouse [phone]           Utilization Review           Renal Colic and Kidney Stones - Care Day 3 (2018) by Karin Albarran RN        Review Entered Review Status       2018 Completed       Details              Care Day: 3 Care Date: 2018 Level of Care: Inpatient Floor       Guideline Day 2        Clinical Status       (X) * Hemodynamic stability       (X) * Vomiting absent or managed       (X) * Adequate oral intake       (X) * Pain absent or managed       2018 1:05 PM EST by Jluis Laguna         Pain 8/10 controlled with dilaudid ivp              ( ) * No positive cultures, or outpatient antibiotic treatment acceptable       2018 1:05 PM EST by Jluis Laguna         Urine cx positive waiting on sensitivity afebrile continues to have abd and flank pain              (X) * Renal function at baseline, improved, or stable       ( ) * Discharge plans and education understood       2018 1:05 PM EST by Jluis Laguna         IV Abx Speciation/sensitivities pending                     Activity       ( ) * Ambulatory              Routes       (X) * Oral hydration, medications, and diet              Interventions       (X) Renal function tests, electrolytes                                 * Milestone              Additional Notes       2/15/18 Review       VS 98.2, 63, 17, 94% RA, 109/70       Pain 7/10       Tx Plan Routine VS, D5LR 125cc/hr, Rocephin ivpb daily, Heparin sc q8hrs, Protonin ivp daily, Cardiac Diet, Dilaudid ivp prn x2, Zofran ivp prn x 1, Roxicodone po qid, Detrol po q12hrs       Hospitalist Note       Denies fevers, but reports shaking chills. No N/V, CP, SOB, abd pain.                Assessment/Plan:              1. UTI with L UVJ stone - continue abx, follow urine cx, growing >100k GNR, 10k possible non-hemo Strep. Speciation/sensitivities pending. Blood cx NGTD. Urology eval appreciated.       2. ALS with fxnl quadriplegia - no acute issues presently. Bedbound status, continue assistance with PO intake.        3. Bladder spasm - Detrol. No new issues.       4. HypoMg2 + - replete.        5. Aim for d/c tomorrow if sensitivities back.              Urology Note       Assessment:       UTI       Left 4 mm stone at UVJ       MS with NGB       Ur culture >100,000 GNB. Await speciation.        DC likely tomorrow on oral abx.         Will get KERA as outpt to ensure stone passage           Renal Colic and Kidney Stones - Care Day 2 (2/15/2018) by Anniece Ganser, RN        Review Entered Review Status       2/16/2018 Completed       Details              Care Day: 2 Care Date: 2/15/2018 Level of Care: Inpatient Floor       Guideline Day 2        Clinical Status       (X) * Hemodynamic stability       2/16/2018 12:58 PM EST by Elpidio Smith         VSS              (X) * Vomiting absent or managed       2/16/2018 12:58 PM EST by Elpidio Smith         No vomiting today              (X) * Adequate oral intake       ( ) * Pain absent or managed       2/16/2018 12:58 PM EST by Elpidio Smith         Pain 7-8 flank  Dilaudid ivpx3 doses today              ( ) * No positive cultures, or outpatient antibiotic treatment acceptable       2/16/2018 12:58 PM EST by Elpidio Smith         Positive Urine cx waiting on Sensitivity Rocephin ivpb daily.  Positive for shafking chills              (X) * Renal function at baseline, improved, or stable       2/16/2018 12:58 PM EST by Branden Pantoja         Baseline              ( ) * Discharge plans and education understood       2/16/2018 12:58 PM EST by Branden Pantoja         Tx for UTI and LUVJ stone                     Activity       ( ) * Ambulatory       2/16/2018 12:58 PM EST by Branden Pantoja         Unable to get out of bed                     Routes       (X) * Oral hydration, medications, and diet       2/16/2018 12:58 PM EST by Branden Pantoja         reg diet                     Interventions       (X) Renal function tests, electrolytes       2/16/2018 12:58 PM EST by Branden Pantoja         wnl                                          * Milestone              Additional Notes       2/15/18 Review       VS 98.2, 63, 17, 94% RA, 109/70       Pain 7/10       Tx Plan Routine VS, D5LR 125cc/hr, Rocephin ivpb daily, Heparin sc q8hrs, Protonin ivp daily, Cardiac Diet, Dilaudid ivp prn x3, Roxicodone po qid       Gluc 102, Crea 0.25, Blood cx no growth, Urine cx Gram Negative Rods-possible Streptococci       Hospitalist Note              UrolHer last BM was Monday, had small stool yesterday. Tends to stay constipated, but had diarrhea in the past after cholecystectomy.               Left flank pain is better, last had pain ~ 2 am. Bladder spasms continue to be painful, was taking pyridium at home, has in her purse. Instructed her not to take any home meds.                Resides at home with son and daughter in law. Has aide 7 hours daily, M-F. Has motorized wc.                She declined heparin subcut.                        Assessment/Plan               1. ALS- Followed by  ALS clinic in [address] 2. Functional quadriplegia - at baseline is Bed bound requires assistance with feeding and drinking        3.  Chronic pain - chronic use of opioids        4. Kidney stone with hydronephrosis - per , will hold on any intervention at this time. Possible D/C tomorrow am if assymptomatic with office F/U with .        8. pcn / macrobid / ancef allergies       9. Bladder spasm - detrol       10. Thin body habitus - consult nutritionist       11. uti >100,000 GNR - on ceftriaxone, follow cx.        12. Anemia macrocytic       13. dvt prophylaxis       14. Full code. Pt and ot.        ogy Note       Plan:   continue antibiotics and hydration. Bridgett Padilla  sx for last  8 hrs. .. may have passed stone into bladder       will hold on any intervention at this time       ?  D/C  tomorrow am if assymptomatic with  office F/U

## 2018-02-16 NOTE — ROUTINE PROCESS
Bedside and Verbal shift change report given to Vinicius GONZALEZ RN (oncoming nurse) by Lyle Camarena (offgoing nurse). Report included the following information SBAR, Kardex and Recent Results.

## 2018-02-16 NOTE — PROGRESS NOTES
Problem: Dysphagia (Adult)  Goal: *Acute Goals and Plan of Care (Insert Text)  Patient will:  1. Tolerate PO trials with 0 s/s overt distress in 4/5 trials  2. Utilize compensatory swallow strategies/maneuvers (decrease bite/sip, size/rate, alt. liq/sol) with min cues in 4/5 trials    Rec:     Reg solid with thin liquids  Assistance with PO  Aspiration precautions  HOB >45 during po intake, remain >30 for 30-45 minutes after po   Small bites/sips; alternate liquid/solid with slow feeding rate   Oral care TID  Meds per pt preference             Outcome: Progressing Towards Goal  Speech language pathology dysphagia treatment    Patient: Senthil Coates (89 y.o. female)  Date: 2/16/2018  Diagnosis: UTI (urinary tract infection)  Kidney stone  UTI (urinary tract infection)  Hydronephrosis  Dehydration Kidney stone       Precautions: aspiration      ASSESSMENT:  Pt was seen at bedside for f/u dysphagia management. Pt tolerated reg solid, puree, and thin liquids from breakfast tray without any overt s/sx of aspiration. Laryngeal elevation appeared mildly weakened to palpation. Pt with increased mastication and delayed a-p transit, worsening throughout meal secondary to poor endurance. She reports that she would like to remain on reg solid foods despite poor endurance. Reg solids with thin liquids do not appear to put her at medical risk for aspiration or malnutrition as long as she is willing to slow rate of intake with PO. D/w pt in detail with verbalized understanding. ST will continue to follow x 1-2 more visits. Progression toward goals:  []         Improving appropriately and progressing toward goals  [x]         Improving slowly and progressing toward goals  []         Not making progress toward goals and plan of care will be adjusted     PLAN:  Recommendations and Planned Interventions: See above  Patient continues to benefit from skilled intervention to address the above impairments.  Continue treatment per established plan of care. Discharge Recommendations:  [address] and To Be Determined     SUBJECTIVE:   Patient stated [place] you for your help. OBJECTIVE:   Cognitive and Communication Status:  Neurologic State: Alert  Orientation Level: Oriented X4  Cognition: Appropriate decision making, Appropriate for age attention/concentration, Appropriate safety awareness, Follows commands     Dysphagia Treatment:  Oral Assessment:  Oral Assessment  Labial: Decreased rate  Dentition: Natural, Intact  Oral Hygiene: Adequate  Lingual: Decreased rate, Decreased strength  Velum: No impairment  Mandible: No impairment  P.O. Trials:   Patient Position: [facility] at [facility]   Vocal quality prior to P.O.: No impairment   Consistency Presented: Thin liquid, Solid, Puree   How Presented: Self-fed/presented, Cup/sip, Spoon, Straw   Bolus Acceptance: No impairment   Bolus Formation/Control: Impaired   Type of Impairment: Mastication, Delayed   Propulsion: Delayed (# of seconds)   Oral Residue: None   Initiation of Swallow: Delayed (# of seconds)   Laryngeal Elevation: Weak   Aspiration Signs/Symptoms: None   Pharyngeal Phase Characteristics: Audible swallow   Effective Modifications: Small sips and bites, Alternate liquids/solids   Cues for Modifications: Minimal-moderate     Oral Phase Severity: Mild   Pharyngeal Phase Severity : Mild     PAIN:  Pt reports 0/10 pain or discomfort prior to tx. Pt reports 0/10 pain or discomfort post tx.      After treatment:   []              Patient left in no apparent distress sitting up in chair  [x]              Patient left in no apparent distress in bed  [x]              Call bell left within reach  [x]              Nursing notified  []              Caregiver present  []              Bed alarm activated      COMMUNICATION/EDUCATION:   [x]              SLP educated pt with regard to compensatory swallow strategies and        aspiration/reflux precautions including: small bites/sips,        alternate liquids/solids, decrease feeding rate, HOB > 45 with all po, and                   upright in bed at 30 degrees after po for at least 45 minutes.      Thank you for this referral.    Wynona Phoenix, M.S. CCC-SLP/L  Speech-Language Pathologist

## 2018-02-16 NOTE — PROGRESS NOTES
Progress Note    Patient: Ron Flores MRN: [id number]  SSN: [id number]    YOB: 1955  Age: 58 y.o. Sex: female      Admit Date: 2/14/2018    LOS: 2 days     Assessment:   UTI  Left 4 mm stone at UVJ  MS with NGB      Plan:     Ur culture >100,000 GNB. Await speciation. DC likely tomorrow on oral abx. Will get KERA as outpt to ensure stone passage      Subjective:     Feels improved. + bladder spasms. Objective:     Vitals:    02/15/18 1200 02/15/18 1600 02/15/18 2003 02/16/18 0400   BP: 120/70 121/77 126/74 129/78   Pulse: 81 83 76 76   Resp: 19 19 18 17   Temp: 98.4 °F (36.9 °C) 97.9 °F (36.6 °C) 98 °F (36.7 °C) 97.6 °F (36.4 °C)   SpO2: 91% 94% 95% 92%   Weight:       Height:            Intake and Output:  Current Shift:    Last three shifts: 02/14 1901 - 02/16 0700  In: 2400 [P.O.:600;  I.V.:1800]  Out: 0     Current Facility-Administered Medications   Medication Dose Route Frequency    HYDROmorphone (DILAUDID) syringe 1 mg  1 mg IntraVENous QID PRN    acetaminophen (TYLENOL) tablet 500 mg  500 mg Oral Q6H PRN    tolterodine (DETROL) tablet 1 mg  1 mg Oral Q12H    sodium chloride (NS) flush 5-10 mL  5-10 mL IntraVENous PRN    dextrose 5% lactated ringers infusion  125 mL/hr IntraVENous CONTINUOUS    ALPRAZolam (XANAX) tablet 0.25 mg  0.25 mg Oral TID    oxyCODONE IR (ROXICODONE) tablet 10 mg  10 mg Oral QID    riluzole (RILUTEK) tablet 50 mg  50 mg Oral Q12H    pantoprazole (PROTONIX) injection 40 mg  40 mg IntraVENous DAILY    senna (SENOKOT) tablet 8.6 mg  1 Tab Oral DAILY    cefTRIAXone (ROCEPHIN) 1 g in sterile water (preservative free) 10 mL IV syringe  1 g IntraVENous Q24H    ondansetron (ZOFRAN) injection 4 mg  4 mg IntraVENous Q6H PRN         Physical Exam:   GENERAL: alert, cooperative, no distress, appears stated age  LUNG: unlabored breathing  EXTREMITIES:  extremities normal, atraumatic, no cyanosis or edema  SKIN: no jaundice  : no CVA tenderness  + suprapubic tenderness      Lab/Data Review:  BMP: Lab Results   Component Value Date/Time     02/16/2018 03:36 AM    K 3.6 02/16/2018 03:36 AM     02/16/2018 03:36 AM    CO2 30 02/16/2018 03:36 AM    AGAP 4 02/16/2018 03:36 AM     (H) 02/16/2018 03:36 AM    BUN 7 02/16/2018 03:36 AM    CREA 0.16 (L) 02/16/2018 03:36 AM    GFRAA >60 02/16/2018 03:36 AM    GFRNA >60 02/16/2018 03:36 AM     CBC: Lab Results   Component Value Date/Time    WBC 8.3 02/16/2018 03:36 AM    HGB 10.3 (L) 02/16/2018 03:36 AM    HCT 32.6 (L) 02/16/2018 03:36 AM     02/16/2018 03:36 AM     COAGS: No results found for: APTT, PTP, INR       CT Results:    Results from Hospital Encounter encounter on 02/14/18   CT ABD PELV W CONT   Narrative CT ABDOMEN AND PELVIS WITH ENHANCEMENT    INDICATION: Left flank pain, dizziness, diarrhea, cloudy urine. TECHNIQUE: Axial images obtained of the abdomen and pelvis following the  uneventful administration of  100 cc's Isovue-300 nonionic intravenous contrast.  Coronal and sagittal reformatted images of the abdomen and pelvis were  obtained. All CT scans at this facility are performed using dose optimization technique as  appropriate to a performed exam, to include automated exposure control,  adjustment of the mA and/or kV according to patient size (including appropriate  matching first site-specific examinations), or use of iterative reconstruction  technique. COMPARISON: None. ABDOMEN FINDINGS:     Liver: Question of hepatic steatosis. Spleen: Unremarkable. Pancreas: Pancreas is atrophic without ductal dilation. Biliary: Cholecystectomy without greater than expected bile duct dilation. Bowel: There is inspissated stool in the rectum. Mild diverticulosis. Appendix  is not visualized. No right lower quadrant inflammation. Peritoneum/ Retroperitoneum: Unremarkable. Lymph Nodes: Unremarkable. Adrenal Glands: Unremarkable.   Kidneys: Right pelviectasis versus extrarenal pelvis without caliectasis or  hydroureter. Moderate left hydronephrosis with delayed enhancement left kidney. There is hydroureter to the UVJ where there is a 4 mm stone. There is prominence  of the left greater than right ureteral walls, especially near the obstructing  ureteral stone. No  Nephrolithiasis or lesion. Vessels: Unremarkable for age. PELVIS FINDINGS:     Bladder/ Pelvic Organs: Bladder is distended without wall thickening or  perivesicular haziness. There is a small foci of gas in the nondependent  bladder. Status post hysterectomy. No suspicious adnexal lesions. Mild presacral  haziness. Lung Base: Incompletely visualized line with lead in the right ventricle. Regions of subsegmental atelectasis or scarring at the lung bases with mild  associated bronchiectasis. Bones: Mild anasarca. Muscles are atrophic. Osteopenia. Lumbar facet  hypertrophy. Severe degenerative disc disease L4-L5 and L5-S1. Mild scoliosis. Narrowing bilateral hip joint spaces. Impression IMPRESSION:    1.  4 mm obstructing left UVJ stone with moderate hydronephrosis. 2.  Mild prominence of the ureteral walls which may be associated with passed  stone or ureteritis. Correlate with urinalysis. 3.  Small foci of gas in the bladder which can be associated with infection or  recent instrumentation. 4.  Questionable hepatic steatosis, though better evaluated without IV contrast.  5.  Mild diverticulosis. US Results:    Results from Orders Only encounter on 17   US RETROPERITONEUM COMP   Impression Auth Prov: Alysia Gilmore  CC Prov:            [address] ULTRASOUND  [address] [facility] [address]  [phone]      Imaging Result  Name:    Mariaelena Savage ([id number])  Sex: Female :  1955  58year old   Patient Class:   Outpatient Private Diagnosis:  Calculus of kidney [N20.0 (ICD-10-CM)]            Procedures Performed: Exam Date/Time: Reason for Exam:  [address]  [id number] 08/07/2017 10:42 AM  None Specified           EXAM: Renal ultrasound.     CLINICAL HISTORY: History of hydronephrosis, followup     COMPARISON EXAMINATIONS: None.     TECHNIQUE:  Multiple grayscale and color sonographic images of the kidneys and retroperitoneum submitted for review.     FINDINGS: Examination is limited secondary to patient immobility     RIGHT KIDNEY: The right kidney measures 10.6 cm in length. Normal cortical echogenicity. No suspicious mass, calculus or hydronephrosis.      LEFT KIDNEY:  The left kidney measures 11.5 cm in length. Normal cortical echogenicity. No suspicious mass, calculus or hydronephrosis.      OTHER: Bladder is moderately distended and within normal limits. Bilateral ureteral jets visualized. IMPRESSION  IMPRESSION:     No significant hydronephrosis appreciated.   Dictated by: Ranjana Bates on Mon Aug 7, 2017 11:15:07 AM EDT      Signed By:Gabriel Tierney MD  8/7/2017 11:17 AM                    [facility] [[id number]]              Principal Problem:    Kidney stone (2/14/2018)    Active Problems:    AICD (automatic cardioverter/defibrillator) present (10/9/2012)      UTI (urinary tract infection) (2/14/2018)      ALS (amyotrophic lateral sclerosis) ([facility] [address] (2/14/2018)      Hydronephrosis (2/14/2018)      Dehydration (2/14/2018)          Signed By: Gretchen St MD , FACS    February 16, 2018      PAGER:  [phone]  OFFICE:  [address] [place] [address]

## 2018-02-16 NOTE — ROUTINE PROCESS
Bedside and Verbal shift change report given to Jay DÍAZ RN (oncoming nurse) by Elicia Juan (offgoing nurse). Report included the following information SBAR, Kardex and Recent Results.

## 2018-02-17 VITALS
RESPIRATION RATE: 20 BRPM | SYSTOLIC BLOOD PRESSURE: 123 MMHG | BODY MASS INDEX: 23.06 KG/M2 | TEMPERATURE: 99.6 F | HEART RATE: 90 BPM | HEIGHT: 66 IN | DIASTOLIC BLOOD PRESSURE: 81 MMHG | OXYGEN SATURATION: 94 % | WEIGHT: 143.5 LBS

## 2018-02-17 LAB
ANION GAP SERPL CALC-SCNC: 5 MMOL/L (ref 3–18)
BACTERIA SPEC CULT: ABNORMAL
BASOPHILS # BLD: 0 K/UL (ref 0–0.1)
BASOPHILS NFR BLD: 0 % (ref 0–2)
BUN SERPL-MCNC: 5 MG/DL (ref 7–18)
BUN/CREAT SERPL: ABNORMAL (ref 12–20)
CALCIUM SERPL-MCNC: 8.5 MG/DL (ref 8.5–10.1)
CHLORIDE SERPL-SCNC: 108 MMOL/L (ref 100–108)
CO2 SERPL-SCNC: 30 MMOL/L (ref 21–32)
CREAT SERPL-MCNC: <0.15 MG/DL (ref 0.6–1.3)
DIFFERENTIAL METHOD BLD: ABNORMAL
EOSINOPHIL # BLD: 0.2 K/UL (ref 0–0.4)
EOSINOPHIL NFR BLD: 2 % (ref 0–5)
ERYTHROCYTE [DISTWIDTH] IN BLOOD BY AUTOMATED COUNT: 14.5 % (ref 11.6–14.5)
GLUCOSE SERPL-MCNC: 104 MG/DL (ref 74–99)
HCT VFR BLD AUTO: 35.7 % (ref 35–45)
HGB BLD-MCNC: 11.1 G/DL (ref 12–16)
LYMPHOCYTES # BLD: 2.7 K/UL (ref 0.9–3.6)
LYMPHOCYTES NFR BLD: 39 % (ref 21–52)
MAGNESIUM SERPL-MCNC: 2.1 MG/DL (ref 1.6–2.6)
MCH RBC QN AUTO: 29.3 PG (ref 24–34)
MCHC RBC AUTO-ENTMCNC: 31.1 G/DL (ref 31–37)
MCV RBC AUTO: 94.2 FL (ref 74–97)
MONOCYTES # BLD: 0.6 K/UL (ref 0.05–1.2)
MONOCYTES NFR BLD: 9 % (ref 3–10)
NEUTS SEG # BLD: 3.5 K/UL (ref 1.8–8)
NEUTS SEG NFR BLD: 50 % (ref 40–73)
PLATELET # BLD AUTO: 310 K/UL (ref 135–420)
PMV BLD AUTO: 9.7 FL (ref 9.2–11.8)
POTASSIUM SERPL-SCNC: 3.8 MMOL/L (ref 3.5–5.5)
RBC # BLD AUTO: 3.79 M/UL (ref 4.2–5.3)
SERVICE CMNT-IMP: ABNORMAL
SODIUM SERPL-SCNC: 143 MMOL/L (ref 136–145)
WBC # BLD AUTO: 7 K/UL (ref 4.6–13.2)

## 2018-02-17 PROCEDURE — 83735 ASSAY OF MAGNESIUM: CPT | Performed by: FAMILY MEDICINE

## 2018-02-17 PROCEDURE — 80048 BASIC METABOLIC PNL TOTAL CA: CPT | Performed by: FAMILY MEDICINE

## 2018-02-17 PROCEDURE — 85025 COMPLETE CBC W/AUTO DIFF WBC: CPT | Performed by: FAMILY MEDICINE

## 2018-02-17 PROCEDURE — 74011258636 HC RX REV CODE- 258/636: Performed by: UROLOGY

## 2018-02-17 PROCEDURE — 74011250637 HC RX REV CODE- 250/637: Performed by: FAMILY MEDICINE

## 2018-02-17 PROCEDURE — 74011250636 HC RX REV CODE- 250/636: Performed by: HOSPITALIST

## 2018-02-17 PROCEDURE — 74011250637 HC RX REV CODE- 250/637: Performed by: INTERNAL MEDICINE

## 2018-02-17 PROCEDURE — 74011250637 HC RX REV CODE- 250/637: Performed by: HOSPITALIST

## 2018-02-17 PROCEDURE — 36415 COLL VENOUS BLD VENIPUNCTURE: CPT | Performed by: FAMILY MEDICINE

## 2018-02-17 RX ORDER — NITROFURANTOIN MACROCRYSTALS 50 MG/1
100 CAPSULE ORAL EVERY 6 HOURS
Status: DISCONTINUED | OUTPATIENT
Start: 2018-02-17 | End: 2018-02-17 | Stop reason: HOSPADM

## 2018-02-17 RX ORDER — NITROFURANTOIN 25; 75 MG/1; MG/1
100 CAPSULE ORAL 2 TIMES DAILY
Qty: 14 CAP | Refills: 0 | Status: SHIPPED | OUTPATIENT
Start: 2018-02-17 | End: 2018-02-24

## 2018-02-17 RX ORDER — HYDROCODONE BITARTRATE AND ACETAMINOPHEN 5; 325 MG/1; MG/1
1 TABLET ORAL
Status: DISCONTINUED | OUTPATIENT
Start: 2018-02-17 | End: 2018-02-17 | Stop reason: HOSPADM

## 2018-02-17 RX ADMIN — SODIUM CHLORIDE, SODIUM LACTATE, POTASSIUM CHLORIDE, CALCIUM CHLORIDE, AND DEXTROSE MONOHYDRATE 125 ML/HR: 600; 310; 30; 20; 5 INJECTION, SOLUTION INTRAVENOUS at 03:29

## 2018-02-17 RX ADMIN — ALPRAZOLAM 0.25 MG: 0.25 TABLET ORAL at 16:30

## 2018-02-17 RX ADMIN — TOLTERODINE TARTRATE 1 MG: 1 TABLET, FILM COATED ORAL at 10:55

## 2018-02-17 RX ADMIN — RILUZOLE 50 MG: 50 TABLET, FILM COATED ORAL at 09:42

## 2018-02-17 RX ADMIN — HYDROCODONE BITARTRATE AND ACETAMINOPHEN 1 TABLET: 5; 325 TABLET ORAL at 12:23

## 2018-02-17 RX ADMIN — NITROFURANTOIN MACROCRYSTALS 100 MG: 50 CAPSULE ORAL at 11:25

## 2018-02-17 RX ADMIN — ALPRAZOLAM 0.25 MG: 0.25 TABLET ORAL at 09:42

## 2018-02-17 RX ADMIN — OXYCODONE HYDROCHLORIDE 10 MG: 5 TABLET ORAL at 13:43

## 2018-02-17 RX ADMIN — HYDROMORPHONE HYDROCHLORIDE 1 MG: 1 INJECTION, SOLUTION INTRAMUSCULAR; INTRAVENOUS; SUBCUTANEOUS at 03:29

## 2018-02-17 RX ADMIN — OXYCODONE HYDROCHLORIDE 10 MG: 5 TABLET ORAL at 09:42

## 2018-02-17 NOTE — PROGRESS NOTES
Discharge instructions provided to patient. Patient verbalized her understanding. Prescription sent to her [facility] pharmacy. Patient with no further questions or complaints, awaiting for transport to take patient home at this time.

## 2018-02-17 NOTE — PROGRESS NOTES
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    Bunion of great toe R03.448    Biliary colic D06.49    Chest pain R07.9    Anxiety attack F41.0    Elevated blood pressure (not hypertension) R03.0    Migraine G43.909    Leg edema R60.0    Vitamin D deficiency E55.9    Colon polyp F85.1    Diastolic dysfunction I66.8    Benign hypertension I10    Paroxysmal ventricular tachycardia (HCC) I47.2    Osteoarthrosis, unspecified whether generalized or localized, unspecified site M19.90    Disc displacement [id number]    Macrocytosis D75.89    Hepatitis C carrier (HCC) B18.2    Rosacea L71.9    Myalgia and myositis, unspecified [id number]    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    Memory loss R41.3    Fibromyalgia M79.7    Cardiac arrhythmia I49.9    Ventricular tachycardia (paroxysmal) (HCC) I47.2    ICD (implantable cardiac defibrillator) in place Z95.810    Hx: UTI (urinary tract infection) Z87.440    Fibroid D25.9    Osteoarthritis M19.90    Back pain M54.9    Leg pain M79.606    Depression F32.9    AICD (automatic cardioverter/defibrillator) present Z95.810    Hemorrhoid K64.9    Diverticula of colon K57.30    Esophageal reflux K21.9    Muscle atrophy M62.50    Piercing [id number]    Kidney stone N20.0    UTI (urinary tract infection) N39.0    ALS (amyotrophic lateral sclerosis) ([facility]) G12.21    Hydronephrosis N13.30    Dehydration E86.0       ASSESSMENT: Crispin Chandler is a 58 y.o. female:   1. 4mm Left distal ureteral UVJ stone  2. UTI  3. NGB from [address] [place] [address] Rd:    1. UrCx = Ecoli and Enterococcus -    Switch abx to culture specific abx - oral  2. May be d/c home today  3.  F/u with Dr Anupam Mendez in 2weeks,  for definitive stone mgmt    Tae Verma MD  Pager: [phone]      February 17, 2018      Daily Progress Note: 2/17/2018 9:17 AM  Admit Date: 2/14/2018     Subjective:     No acute  changes / events. Raymon Millan is doing good. She reports pain is absent. She has no complaints. She is tolerating clear liquids and unable to get out of bed. Objective:     Visit Vitals    /90 (BP 1 Location: Right arm, BP Patient Position: At rest)    Pulse 87    Temp 97.4 °F (36.3 °C)    Resp 20    Ht 5' 6\" (1.676 m)    Wt 143 lb 8 oz (65.1 kg)    SpO2 97%    Breastfeeding No    BMI 23.16 kg/m2        Temp (24hrs), Av °F (36.7 °C), Min:97.4 °F (36.3 °C), Max:98.4 °F (36.9 °C)      Intake and Output:  02/15 1901 -  0700  In: 4200.8 [P.O.:120; I.V.:4080.8]  Out: 0        PHYSICAL EXAMINATION:   Visit Vitals    /90 (BP 1 Location: Right arm, BP Patient Position: At rest)    Pulse 87    Temp 97.4 °F (36.3 °C)    Resp 20    Ht 5' 6\" (1.676 m)    Wt 143 lb 8 oz (65.1 kg)    SpO2 97%    Breastfeeding No    BMI 23.16 kg/m2     Constitutional: Well developed, well nourished. No acute distress. HEENT: Normocephalic\  CV:  RRR  Respiratory: No respiratory distress or difficulties breathing   Abdomen:  Soft, non-tender, non-distended Left flank pain.  Female:  CVA tenderness: \                       \\   Skin: No evidence of jaundice. Normal color  Neuro/Psych:  Alert and oriented. Affect appropriate. Lab/Data Review:  Lab results reviewed. For significant abnormal values and values requiring intervention, see assessment and plan.     Labs:     Labs: Results:   Chemistry  Recent Labs      18   0535  18   0336  02/15/18   0245  18   1052   GLU  104*  117*  102*  134*   NA  143  142  140  139   K  3.8  3.6  3.6  3.9   CL  108  108  108  106   CO2  30  30  27  25   BUN  5*  7  9  17   CREA  <0.15*  0.16*  0.25*  0.30*   CA  8.5  8.3*  8.6  9.3   AGAP  5  4  5  8   BUCR  Cannot be calculated  44*  36*  57*   AP   --    --    --   72   TP   --    --    --   7.0   ALB   --    --    --   3.3*   GLOB   --    --    --   3.7   AGRAT   -- --    --   0.9      CBC w/Diff Recent Labs      02/17/18   0535  02/16/18   0336  02/15/18   0245   WBC  7.0  8.3  10.2   RBC  3.79*  3.48*  3.79*   HGB  11.1*  10.3*  11.4*   HCT  35.7  32.6*  35.6   PLT  310  298  341   GRANS  50  46  58   LYMPH  39  44  33   EOS  2  1  0      Cultures Recent Labs      02/14/18   1513  02/14/18   1247  02/14/18 1230   CULT  >100,000 COLONIES/mL ESCHERICHIA COLI*  50326 COLONIES/mL ENTEROCOCCUS GALLINARUM GROUP D*  :THIS ORGANISM IS NOT CONSIDERED TO BE VRE DUE TO INTRINSIC LOW LEVEL, NON TRANSFERABLE RESISTANCE. THE TRANSFERABLE, HIGH LEVEL RESISTANCE ASSOCIATED WITH E.FAECALIS AND E. FAECIUM IS THE INTENDED FOCUS OF INFECTION CONTROL EFFORTS. NO GROWTH 3 DAYS  NO GROWTH 3 DAYS     All Micro Results     Procedure Component Value Units Date/Time    CULTURE, URINE [[id number]]  (Abnormal)  (Susceptibility) Collected:  02/14/18 1513    Order Status:  Completed Specimen:  Clean catch Updated:  02/17/18 0713     Special Requests: NO SPECIAL REQUESTS        Culture result:         >100,000 COLONIES/mL ESCHERICHIA COLI (A)              81780 COLONIES/mL ENTEROCOCCUS GALLINARUM GROUP D (A)              :THIS ORGANISM IS NOT CONSIDERED TO BE VRE DUE TO INTRINSIC LOW LEVEL, NON TRANSFERABLE RESISTANCE. THE TRANSFERABLE, HIGH LEVEL RESISTANCE ASSOCIATED WITH E.FAECALIS AND E. FAECIUM IS THE INTENDED FOCUS OF INFECTION CONTROL EFFORTS.     CULTURE, BLOOD [[id number]] Collected:  02/14/18 1230    Order Status:  Completed Specimen:  Blood from Blood Updated:  02/17/18 0647     Special Requests: NO SPECIAL REQUESTS        Culture result: NO GROWTH 3 DAYS       CULTURE, BLOOD [[id number]] Collected:  02/14/18 1247    Order Status:  Completed Specimen:  Blood from Blood Updated:  02/17/18 0647     Special Requests: NO SPECIAL REQUESTS        Culture result: NO GROWTH 3 DAYS       CULTURE, URINE [[id number]] Collected:  02/14/18 1900    Order Status:  Canceled Specimen:  Urine from Clean catch Urinalysis Color   Date Value Ref Range Status   02/14/2018 YELLOW   Final     Appearance   Date Value Ref Range Status   02/14/2018 TURBID   Final     Specific gravity   Date Value Ref Range Status   02/14/2018 1.024 1.005 - 1.030   Final     pH (UA)   Date Value Ref Range Status   02/14/2018 6.0 5.0 - 8.0   Final     Protein   Date Value Ref Range Status   02/14/2018 NEGATIVE  NEG mg/dL Final     Ketone   Date Value Ref Range Status   02/14/2018 40 (A) NEG mg/dL Final     Bilirubin   Date Value Ref Range Status   02/14/2018 NEGATIVE  NEG   Final     Blood   Date Value Ref Range Status   02/14/2018 SMALL (A) NEG   Final     Urobilinogen   Date Value Ref Range Status   02/14/2018 0.2 0.2 - 1.0 EU/dL Final     Nitrites   Date Value Ref Range Status   02/14/2018 NEGATIVE  NEG   Final     Leukocyte Esterase   Date Value Ref Range Status   02/14/2018 LARGE (A) NEG   Final     Potassium   Date Value Ref Range Status   02/17/2018 3.8 3.5 - 5.5 mmol/L Final     Creatinine   Date Value Ref Range Status   02/17/2018 <0.15 (L) 0.6 - 1.3 MG/DL Final     BUN   Date Value Ref Range Status   02/17/2018 5 (L) 7.0 - 18 MG/DL Final      PSA No results for input(s): PSA in the last 72 hours. Coagulation No results found for: PTP, INR, APTT        Micro  Recent Labs      02/14/18   1513  02/14/18   1247  02/14/18   1230   CULT  >100,000 COLONIES/mL ESCHERICHIA COLI*  53894 COLONIES/mL ENTEROCOCCUS GALLINARUM GROUP D*  :THIS ORGANISM IS NOT CONSIDERED TO BE VRE DUE TO INTRINSIC LOW LEVEL, NON TRANSFERABLE RESISTANCE. THE TRANSFERABLE, HIGH LEVEL RESISTANCE ASSOCIATED WITH E.FAECALIS AND E. FAECIUM IS THE INTENDED FOCUS OF INFECTION CONTROL EFFORTS.   NO GROWTH 3 DAYS  NO GROWTH 3 DAYS     Recent Labs      02/14/18   1513  02/14/18   1247  02/14/18   1230   CULT  >100,000 COLONIES/mL ESCHERICHIA COLI*  74576 COLONIES/mL ENTEROCOCCUS GALLINARUM GROUP D*  :THIS ORGANISM IS NOT CONSIDERED TO BE VRE DUE TO INTRINSIC LOW LEVEL, NON TRANSFERABLE RESISTANCE. THE TRANSFERABLE, HIGH LEVEL RESISTANCE ASSOCIATED WITH E.FAECALIS AND E. FAECIUM IS THE INTENDED FOCUS OF INFECTION CONTROL EFFORTS. NO GROWTH 3 DAYS  NO GROWTH 3 DAYS        US Results: (Most Recent)   Results from Orders Only encounter on 17   US RETROPERITONEUM COMP   Impression Auth Prov: Maximilian Lazaro  CC Prov:            [address] ULTRASOUND  [address]  [place] [address] [facility] [phone]      Imaging Result  Name:    Alvaro Greenberg ([id number])  Sex: Female :  1955  58year old   Patient Class: Outpatient Private Diagnosis:  Calculus of kidney [N20.0 (ICD-10-CM)]            Procedures Performed: Exam Date/Time: Reason for Exam:  US RETROPERITONEAL  [id number] 2017 10:42 AM  None Specified           EXAM: Renal ultrasound.     CLINICAL HISTORY: History of hydronephrosis, followup     COMPARISON EXAMINATIONS: None.     TECHNIQUE:  Multiple grayscale and color sonographic images of the kidneys and retroperitoneum submitted for review.     FINDINGS: Examination is limited secondary to patient immobility     RIGHT KIDNEY: The right kidney measures 10.6 cm in length. Normal cortical echogenicity. No suspicious mass, calculus or hydronephrosis.      LEFT KIDNEY:  The left kidney measures 11.5 cm in length. Normal cortical echogenicity. No suspicious mass, calculus or hydronephrosis.      OTHER: Bladder is moderately distended and within normal limits. Bilateral ureteral jets visualized. IMPRESSION  IMPRESSION:     No significant hydronephrosis appreciated.   Dictated by: Shanti Villavicencio on Mon Aug 7, 2017 11:15:07 AM EDT      Signed By:Baudilio Tierney MD  2017 11:17 AM                    [facility] [[id number]]             CT (Most Recent)   Results from Hospital Encounter encounter on 18   CT ABD PELV W CONT   Narrative CT ABDOMEN AND PELVIS WITH ENHANCEMENT    INDICATION: Left flank pain, dizziness, diarrhea, cloudy urine. TECHNIQUE: Axial images obtained of the abdomen and pelvis following the  uneventful administration of  100 cc's Isovue-300 nonionic intravenous contrast.  Coronal and sagittal reformatted images of the abdomen and pelvis were  obtained. All CT scans at this facility are performed using dose optimization technique as  appropriate to a performed exam, to include automated exposure control,  adjustment of the mA and/or kV according to patient size (including appropriate  matching first site-specific examinations), or use of iterative reconstruction  technique. COMPARISON: None. ABDOMEN FINDINGS:     Liver: Question of hepatic steatosis. Spleen: Unremarkable. Pancreas: Pancreas is atrophic without ductal dilation. Biliary: Cholecystectomy without greater than expected bile duct dilation. Bowel: There is inspissated stool in the rectum. Mild diverticulosis. Appendix  is not visualized. No right lower quadrant inflammation. Peritoneum/ Retroperitoneum: Unremarkable. Lymph Nodes: Unremarkable. Adrenal Glands: Unremarkable. Kidneys: Right pelviectasis versus extrarenal pelvis without caliectasis or  hydroureter. Moderate left hydronephrosis with delayed enhancement left kidney. There is hydroureter to the UVJ where there is a 4 mm stone. There is prominence  of the left greater than right ureteral walls, especially near the obstructing  ureteral stone. No  Nephrolithiasis or lesion. Vessels: Unremarkable for age. PELVIS FINDINGS:     Bladder/ Pelvic Organs: Bladder is distended without wall thickening or  perivesicular haziness. There is a small foci of gas in the nondependent  bladder. Status post hysterectomy. No suspicious adnexal lesions. Mild presacral  haziness. Lung Base: Incompletely visualized line with lead in the right ventricle. Regions of subsegmental atelectasis or scarring at the lung bases with mild  associated bronchiectasis.     Bones: Mild anasarca. Muscles are atrophic. Osteopenia. Lumbar facet  hypertrophy. Severe degenerative disc disease L4-L5 and L5-S1. Mild scoliosis. Narrowing bilateral hip joint spaces. Impression IMPRESSION:    1.  4 mm obstructing left UVJ stone with moderate hydronephrosis. 2.  Mild prominence of the ureteral walls which may be associated with passed  stone or ureteritis. Correlate with urinalysis. 3.  Small foci of gas in the bladder which can be associated with infection or  recent instrumentation. 4.  Questionable hepatic steatosis, though better evaluated without IV contrast.  5.  Mild diverticulosis.                Current Facility-Administered Medications   Medication Dose Route Frequency    oxymetazoline (AFRIN) 0.05 % nasal spray 2 Spray  2 Spray Both Nostrils BID PRN    HYDROmorphone (DILAUDID) syringe 1 mg  1 mg IntraVENous QID PRN    acetaminophen (TYLENOL) tablet 500 mg  500 mg Oral Q6H PRN    tolterodine (DETROL) tablet 1 mg  1 mg Oral Q12H    sodium chloride (NS) flush 5-10 mL  5-10 mL IntraVENous PRN    dextrose 5% lactated ringers infusion  125 mL/hr IntraVENous CONTINUOUS    ALPRAZolam (XANAX) tablet 0.25 mg  0.25 mg Oral TID    oxyCODONE IR (ROXICODONE) tablet 10 mg  10 mg Oral QID    riluzole (RILUTEK) tablet 50 mg  50 mg Oral Q12H    senna (SENOKOT) tablet 8.6 mg  1 Tab Oral DAILY    cefTRIAXone (ROCEPHIN) 1 g in sterile water (preservative free) 10 mL IV syringe  1 g IntraVENous Q24H    ondansetron (ZOFRAN) injection 4 mg  4 mg IntraVENous Q6H PRN

## 2018-02-17 NOTE — DISCHARGE INSTRUCTIONS
Patient armband removed and shredded  MyChart Activation    Thank you for requesting access to [facility]. Please follow the instructions below to securely access and download your online medical record. [facility] allows you to send messages to your doctor, view your test results, renew your prescriptions, schedule appointments, and more. How Do I Sign Up? 1. In your internet browser, go to [email or web address]  2. Click on the First Time User? Click Here link in the Sign In box. You will be redirect to the New Member Sign Up page. 3. Enter your [facility] Access Code exactly as it appears below. You will not need to use this code after youve completed the sign-up process. If you do not sign up before the expiration date, you must request a new code. [facility] Access Code: [email or web address]  Expires: 2018  2:57 PM (This is the date your [facility] access code will )    4. Enter the last four digits of your Social Security Number (xxxx) and Date of Birth (mm/dd/yyyy) as indicated and click Submit. You will be taken to the next sign-up page. 5. Create a [facility] ID. This will be your [facility] login ID and cannot be changed, so think of one that is secure and easy to remember. 6. Create a [facility] password. You can change your password at any time. 7. Enter your Password Reset Question and Answer. This can be used at a later time if you forget your password. 8. Enter your e-mail address. You will receive e-mail notification when new information is available in [address]. 9. Click Sign Up. You can now view and download portions of your medical record. 10. Click the Download Summary menu link to download a portable copy of your medical information. Additional Information    If you have questions, please visit the Frequently Asked Questions section of the [facility] website at [email or web address] Remember, [facility] is NOT to be used for urgent needs.  For medical emergencies, dial Braden Al from Nurse    PATIENT INSTRUCTIONS:    After general anesthesia or intravenous sedation, for 24 hours or while taking prescription Narcotics:  · Limit your activities  · Do not drive and operate hazardous machinery  · Do not make important personal or business decisions  · Do  not drink alcoholic beverages  · If you have not urinated within 8 hours after discharge, please contact your surgeon on call. Report the following to your surgeon:  · Excessive pain, swelling, redness or odor of or around the surgical area  · Temperature over 100.5  · Nausea and vomiting lasting longer than 4 hours or if unable to take medications  · Any signs of decreased circulation or nerve impairment to extremity: change in color, persistent  numbness, tingling, coldness or increase pain  · Any questions    What to do at Home:  Recommended activity: Activity as tolerated    *  Please give a list of your current medications to your Primary Care Provider. *  Please update this list whenever your medications are discontinued, doses are      changed, or new medications (including over-the-counter products) are added. *  Please carry medication information at all times in case of emergency situations. These are general instructions for a healthy lifestyle:    No smoking/ No tobacco products/ Avoid exposure to second hand smoke  Surgeon General's Warning:  Quitting smoking now greatly reduces serious risk to your health. Obesity, smoking, and sedentary lifestyle greatly increases your risk for illness    A healthy diet, regular physical exercise & weight monitoring are important for maintaining a healthy lifestyle    You may be retaining fluid if you have a history of heart failure or if you experience any of the following symptoms:  Weight gain of 3 pounds or more overnight or 5 pounds in a week, increased swelling in our hands or feet or shortness of breath while lying flat in bed.   Please call your doctor as soon as you notice any of these symptoms; do not wait until your next office visit. Recognize signs and symptoms of STROKE:    F-face looks uneven    A-arms unable to move or move unevenly    S-speech slurred or non-existent    T-time-call 911 as soon as signs and symptoms begin-DO NOT go       Back to bed or wait to see if you get better-TIME IS BRAIN. Warning Signs of HEART ATTACK     Call 911 if you have these symptoms:   Chest discomfort. Most heart attacks involve discomfort in the center of the chest that lasts more than a few minutes, or that goes away and comes back. It can feel like uncomfortable pressure, squeezing, fullness, or pain.  Discomfort in other areas of the upper body. Symptoms can include pain or discomfort in one or both arms, the back, neck, jaw, or stomach.  Shortness of breath with or without chest discomfort.  Other signs may include breaking out in a cold sweat, nausea, or lightheadedness. Don't wait more than five minutes to call 911 - MINUTES MATTER! Fast action can save your life. Calling 911 is almost always the fastest way to get lifesaving treatment. Emergency Medical Services staff can begin treatment when they arrive -- up to an hour sooner than if someone gets to the hospital by car. The discharge information has been reviewed with the patient. The patient verbalized understanding. Discharge medications reviewed with the patient and appropriate educational materials and side effects teaching were provided.   ___________________________________________________________________________________________________________________________________

## 2018-02-17 NOTE — DISCHARGE SUMMARY
Discharge summary dictated. Allergy listed to Macrobid-- syncope. Chart reviewed, noted hx of syncope when on this per urology notes. Pt denies this, stating she gets diarrhea with Augmentin, but that she does not have adverse rxn to Macrobid/nitrofurantoin. Reviewed this with pt. Reports that she felt chronic cystitis better controlled with Macrobid in the past and would like to try again. Will give dose of PO nitrofurantoin, monitor for any adverse rxn. If none, plan to d/c this afternoon. As d/w nursing. ID#: [id number].   Casandra Posada MD  2/17/2018  11:59 AM

## 2018-02-17 NOTE — PROGRESS NOTES
Bedside and Verbal shift change report given to Lakisha Johnson RN (oncoming nurse) by Arsenio Mullen RN (offgoing nurse). Report included the following information SBAR, Kardex, ED Summary, Intake/Output, MAR, Recent Results and Cardiac Rhythm .      Patient Vitals for the past 12 hrs:   Temp Pulse Resp BP SpO2   02/17/18 0733 97.4 °F (36.3 °C) 87 20 154/90 97 %   02/17/18 0400 98.3 °F (36.8 °C) 77 20 153/88 94 %   02/17/18 0002 97.9 °F (36.6 °C) 83 18 143/81 93 %

## 2018-02-17 NOTE — PROGRESS NOTES
Care Management Interventions  PCP Verified by CM: Yes  Mode of Transport at Discharge: [address]  [address] Time of Discharge: 1630  Transition of Care Consult (CM Consult): Discharge Planning  Current Support Network: [place], Family Lives Nearby  Confirm Follow Up Transport: Family  Plan discussed with Pt/Family/Caregiver: Yes  Discharge Location  Discharge Placement: Home    CM attempted to contact pt's son and daughter in law regarding transportation for pt. CM contacted pt's daughter Jerman Zarco who shares that her sister in law is scheduled to transport pt home. She shares that she will text both her brother and sister in law to inform them and to request that they contact CM. Family is willing to assist pt with transportation home, however pt prefers to be transported via Medical Transport. Transport  has been scheduled for 1630 with [facility]. Pt and nurse has been informed.

## 2018-02-17 NOTE — PROGRESS NOTES
Patient given a dose of macrobid. Patient with no complaints of having any reaction to the medication. Patient states \"I feel fine\". Information passed on to Dr. Florentino Suh. Will continue to monitor.

## 2018-02-18 NOTE — DISCHARGE SUMMARY
[address] Oscar Crocker  MR#: [id number]  : 1955  ACCOUNT #: [de-identified]   ADMIT DATE: 2018  DISCHARGE DATE: 2018    DISCHARGE DIAGNOSES:  1. Urinary tract infection with left UVJ stone. 2.  Amyotrophic lateral sclerosis with functional quadriplegia. 3.  Bladder spasm. 4.  Hypomagnesemia. SERVICE:  Patient was admitted to hospitalist service. CONSULTANTS:  Crista Camacho MD, was consulted for urology. PROCEDURES:  None. HISTORY OF PRESENT ILLNESS:  Please refer to admission H and P.    Briefly, the patient is a 58-year-old female with a history significant for the above with chronic interstitial cystitis, who came to the Emergency Department with urinary and flank pain. She has a history of multiple urinary tract infections and is followed by outpatient urology. She has bedbound status with aid with feeding and p.o. intake. When we saw  her, her vital signs were stable and normal, except for pressure 164/90. She appeared to be in no distress, had a regular heart and clear lungs. Benign abdomen. No edema. She had some lab work performed on admission including a metabolic panel, which is within normal limits, normal liver function test, normal CBC as well. She had a urinalysis, which showed yellow turbid urine, specific gravity of 1.024, pH of 6.0, large leukocyte esterase, negative nitrites, too numerous to count WBCs, 4+ bacteria, unable to quantify RBCs. She had a CT of her abdomen and pelvis, which by report demonstrated 4 mm obstructing left UVJ stone with moderate hydronephrosis. Mild prominence of the ureteral walls, which may be associated with passed stone or ureteritis. Correlate with urinalysis. Small foci of gas in the bladder, which can be associated with infection or recent instrumentation. Questionable hepatic steatosis, though better evaluated without IV contrast.  Mild diverticulosis.     She had a chest x-ray on admission as well, negative for acute. HOSPITAL COURSE BY PROBLEM:  1.  UTI:  With UVJ stone. Maintained on antibiotic therapy consisting of IV Rocephin. Blood cultures are negative thus far. Urine culture ended up growing out greater than 100,000 colonies of E. coli, 10,000 colonies of Enterococcus gallinarum, group D. Sensitivities reviewed. I spoke with the patient in regards to her allergies,  Preferably, we would maintain the patient on nitrofurantoin, which both organisms are susceptible to. She does have MACROBID LISTED AS AN ALLERGY, WHICH APPARENTLY CAUSED HER TO PASS OUT. HOWEVER, WHEN I INQUIRE ABOUT THIS, THE PATIENT ADAMANTLY DENIED THIS. SHE STATES THAT SHE GETS DIARRHEA WHEN SHE TAKES AUGMENTIN and that she has taken nitrofurantoin in the past.  We are going to go ahead and trial her on some p.o. nitrofurantoin while here. If she has no symptoms, we will discharge with a prescription for a week's course. Otherwise, she has been doing well. White count remained normal.  She has had no fevers. She has been followed by urology who evaluated her today, cleared for discharge. She will be following up with urology in 2 weeks for definitive stone management. 2.  ALS with functional quadriplegia:  No acute issues. Bed bound status. Continue with assistance. 3.  Bladder spasms: On Detrol. 4.  Hypomagnesemia:  She had a magnesium level of 1.6 measured on the 16th. We replaced this. She had a repeat level of 2.1. On examination today, vital signs are stable and normal.  Last pressure 158/91. She denies any fevers or chills, nausea, vomiting, chest pain, shortness of breath or palpitation or edema. No abdominal pain. She does have some occasional bladder spasms, ongoing now. She has a regular heart, clear lungs, benign abdomen. She has no calf tenderness. No edema.     Labs today include a normal CBC with H and H of 11.1/35.7, stable; and metabolic panel within normal limits. DISPOSITION:  Discharged home. PLAN:  Monitoring for any sort of adverse reaction to Macrobid. MEDICATIONS ON DISCHARGE:   NEW MEDICINES:  As follow:  1. Macrobid 100 mg p.o. b.i.d. for 7 days. Otherwise, resume the followin. Xanax 0.25 mg p.o. t.i.d. p.r.n. anxiety. 2.  Neurontin 100 mg p.o. t.i.d.    3.  Roxicodone 10 mg p.o. q.i.d. as needed for pain. 4.  Riluzole 50 mg p.o. b.i.d.    5.  Detrol 2 mg p.o. b.i.d.    6.  Tylenol 325 mg p.o. q.4 hours p.r.n. pain. FOLLOWUP:    1. With her PCP, Dr. Valentino Quivers in 7-10 days. 2.  With Pro Zhang MD, for urology in 10 to 10-14 days. I have reviewed the records and have seen that the patient does have some documented syncopal episodes when she was on prophylactic therapy for recurrent urinary tract infections including the Macrobid. Again, patient denies at the current time. She would like to go ahead and try this, as this reportedly helped her chronic cystitis. We are going to be giving her doses of nitrofurantoin now, monitor her through the afternoon. If she has no symptoms, I will discharge later this afternoon on the Macrobid. It appears that she had lost some weight when on the Macrobid, decreased p.o. intake. Her syncopal episodes may have been possibly related to this. TIME OF DISCHARGE:  Greater than 30 minutes.       MD FARIHA Ceja/ELIEZER  D: 2018 11:58     T: 2018 13:27  JOB #: [id number]

## 2018-02-20 LAB
BACTERIA SPEC CULT: NORMAL
BACTERIA SPEC CULT: NORMAL
SERVICE CMNT-IMP: NORMAL
SERVICE CMNT-IMP: NORMAL

## 2019-11-27 PROBLEM — G82.50 QUADRIPLEGIA (HCC): Status: ACTIVE | Noted: 2019-01-01

## 2019-11-27 PROBLEM — G93.40 ACUTE ENCEPHALOPATHY: Status: ACTIVE | Noted: 2019-01-01

## 2019-11-27 PROBLEM — E43 SEVERE PROTEIN-CALORIE MALNUTRITION (HCC): Status: ACTIVE | Noted: 2019-01-01

## 2019-11-27 PROBLEM — J96.02 ACUTE RESPIRATORY FAILURE WITH HYPOXIA AND HYPERCAPNIA (HCC): Status: ACTIVE | Noted: 2019-01-01

## 2019-11-27 PROBLEM — J96.01 ACUTE RESPIRATORY FAILURE WITH HYPOXIA AND HYPERCAPNIA (HCC): Status: ACTIVE | Noted: 2019-01-01

## 2019-11-27 PROBLEM — E87.29 RESPIRATORY ACIDOSIS: Status: ACTIVE | Noted: 2019-01-01
